# Patient Record
Sex: FEMALE | Race: BLACK OR AFRICAN AMERICAN | URBAN - METROPOLITAN AREA
[De-identification: names, ages, dates, MRNs, and addresses within clinical notes are randomized per-mention and may not be internally consistent; named-entity substitution may affect disease eponyms.]

---

## 2017-01-24 ENCOUNTER — HISTORICAL (OUTPATIENT)
Dept: ADMINISTRATIVE | Facility: HOSPITAL | Age: 77
End: 2017-01-24

## 2017-07-06 ENCOUNTER — HISTORICAL (OUTPATIENT)
Dept: ADMINISTRATIVE | Facility: HOSPITAL | Age: 77
End: 2017-07-06

## 2017-07-06 LAB
ABS NEUT (OLG): 3.92 X10(3)/MCL (ref 2.1–9.2)
ALBUMIN SERPL-MCNC: 3.6 GM/DL (ref 3.4–5)
ALBUMIN/GLOB SERPL: 1 RATIO (ref 1–2)
ALP SERPL-CCNC: 67 UNIT/L (ref 45–117)
ALT SERPL-CCNC: 16 UNIT/L (ref 12–78)
APTT PPP: 30.1 SECOND(S) (ref 23.3–37)
AST SERPL-CCNC: 14 UNIT/L (ref 15–37)
BASOPHILS # BLD AUTO: 0.03 X10(3)/MCL
BASOPHILS NFR BLD AUTO: 0 % (ref 0–1)
BILIRUB SERPL-MCNC: 0.9 MG/DL (ref 0.2–1)
BILIRUBIN DIRECT+TOT PNL SERPL-MCNC: 0.2 MG/DL
BILIRUBIN DIRECT+TOT PNL SERPL-MCNC: 0.7 MG/DL
BUN SERPL-MCNC: 11 MG/DL (ref 7–18)
CALCIUM SERPL-MCNC: 9.2 MG/DL (ref 8.5–10.1)
CHLORIDE SERPL-SCNC: 105 MMOL/L (ref 98–107)
CK MB SERPL-MCNC: <1 NG/ML (ref 1–3.6)
CK SERPL-CCNC: 177 UNIT/L (ref 26–192)
CO2 SERPL-SCNC: 27 MMOL/L (ref 21–32)
CREAT SERPL-MCNC: 1.1 MG/DL (ref 0.6–1.3)
EOSINOPHIL # BLD AUTO: 0.16 10*3/UL
EOSINOPHIL NFR BLD AUTO: 3 % (ref 0–5)
ERYTHROCYTE [DISTWIDTH] IN BLOOD BY AUTOMATED COUNT: 12.8 % (ref 11.5–14.5)
GLOBULIN SER-MCNC: 4.3 GM/ML (ref 2.3–3.5)
GLUCOSE SERPL-MCNC: 94 MG/DL (ref 74–106)
HCT VFR BLD AUTO: 39.9 % (ref 35–46)
HGB BLD-MCNC: 13.8 GM/DL (ref 12–16)
IMM GRANULOCYTES # BLD AUTO: 0.03 10*3/UL
IMM GRANULOCYTES NFR BLD AUTO: 0 %
INR PPP: 0.96 (ref 0.9–1.2)
LYMPHOCYTES # BLD AUTO: 1.68 X10(3)/MCL
LYMPHOCYTES NFR BLD AUTO: 27 % (ref 15–40)
MCH RBC QN AUTO: 30.9 PG (ref 26–34)
MCHC RBC AUTO-ENTMCNC: 34.6 GM/DL (ref 31–37)
MCV RBC AUTO: 89.3 FL (ref 80–100)
MONOCYTES # BLD AUTO: 0.41 X10(3)/MCL
MONOCYTES NFR BLD AUTO: 7 % (ref 4–12)
NEUTROPHILS # BLD AUTO: 3.92 X10(3)/MCL
NEUTROPHILS NFR BLD AUTO: 63 X10(3)/MCL
PLATELET # BLD AUTO: 183 X10(3)/MCL (ref 130–400)
PMV BLD AUTO: 11.2 FL (ref 7.4–10.4)
POC TROPONIN: 0.01 NG/ML (ref 0–0.08)
POTASSIUM SERPL-SCNC: 3.4 MMOL/L (ref 3.5–5.1)
PROT SERPL-MCNC: 7.9 GM/DL (ref 6.4–8.2)
PROTHROMBIN TIME: 12.6 SECOND(S) (ref 11.9–14.4)
RBC # BLD AUTO: 4.47 X10(6)/MCL (ref 4–5.2)
SODIUM SERPL-SCNC: 143 MMOL/L (ref 136–145)
WBC # SPEC AUTO: 6.2 X10(3)/MCL (ref 4.5–11)

## 2017-11-30 ENCOUNTER — HISTORICAL (OUTPATIENT)
Dept: ADMINISTRATIVE | Facility: HOSPITAL | Age: 77
End: 2017-11-30

## 2017-12-11 ENCOUNTER — HISTORICAL (OUTPATIENT)
Dept: ADMINISTRATIVE | Facility: HOSPITAL | Age: 77
End: 2017-12-11

## 2017-12-11 LAB
ABS NEUT (OLG): 3.8 X10(3)/MCL (ref 2.1–9.2)
ALBUMIN SERPL-MCNC: 3.7 GM/DL (ref 3.4–5)
ALBUMIN/GLOB SERPL: 1 RATIO (ref 1–2)
ALP SERPL-CCNC: 79 UNIT/L (ref 45–117)
ALT SERPL-CCNC: 17 UNIT/L (ref 12–78)
AST SERPL-CCNC: 19 UNIT/L (ref 15–37)
BASOPHILS # BLD AUTO: 0.03 X10(3)/MCL
BASOPHILS NFR BLD AUTO: 0 % (ref 0–1)
BILIRUB SERPL-MCNC: 1.3 MG/DL (ref 0.2–1)
BILIRUBIN DIRECT+TOT PNL SERPL-MCNC: 0.3 MG/DL
BILIRUBIN DIRECT+TOT PNL SERPL-MCNC: 1 MG/DL
BUN SERPL-MCNC: 8 MG/DL (ref 7–18)
CALCIUM SERPL-MCNC: 9.2 MG/DL (ref 8.5–10.1)
CHLORIDE SERPL-SCNC: 103 MMOL/L (ref 98–107)
CHOLEST SERPL-MCNC: 217 MG/DL
CHOLEST/HDLC SERPL: 2.2 {RATIO} (ref 0–4.4)
CO2 SERPL-SCNC: 30 MMOL/L (ref 21–32)
CREAT SERPL-MCNC: 1 MG/DL (ref 0.6–1.3)
EOSINOPHIL # BLD AUTO: 0.18 X10(3)/MCL
EOSINOPHIL NFR BLD AUTO: 3 % (ref 0–5)
ERYTHROCYTE [DISTWIDTH] IN BLOOD BY AUTOMATED COUNT: 12.9 % (ref 11.5–14.5)
GLOBULIN SER-MCNC: 4.5 GM/ML (ref 2.3–3.5)
GLUCOSE SERPL-MCNC: 90 MG/DL (ref 74–106)
HCT VFR BLD AUTO: 42.1 % (ref 35–46)
HDLC SERPL-MCNC: 99 MG/DL
HGB BLD-MCNC: 14.7 GM/DL (ref 12–16)
IMM GRANULOCYTES # BLD AUTO: 0.01 10*3/UL
IMM GRANULOCYTES NFR BLD AUTO: 0 %
LDLC SERPL CALC-MCNC: 103 MG/DL (ref 0–130)
LYMPHOCYTES # BLD AUTO: 1.69 X10(3)/MCL
LYMPHOCYTES NFR BLD AUTO: 28 % (ref 15–40)
MCH RBC QN AUTO: 31.5 PG (ref 26–34)
MCHC RBC AUTO-ENTMCNC: 34.9 GM/DL (ref 31–37)
MCV RBC AUTO: 90.3 FL (ref 80–100)
MONOCYTES # BLD AUTO: 0.34 X10(3)/MCL
MONOCYTES NFR BLD AUTO: 6 % (ref 4–12)
NEUTROPHILS # BLD AUTO: 3.8 X10(3)/MCL
NEUTROPHILS NFR BLD AUTO: 63 X10(3)/MCL
PLATELET # BLD AUTO: 217 X10(3)/MCL (ref 130–400)
PMV BLD AUTO: 10.7 FL (ref 7.4–10.4)
POTASSIUM SERPL-SCNC: 3.6 MMOL/L (ref 3.5–5.1)
PROT SERPL-MCNC: 8.2 GM/DL (ref 6.4–8.2)
RBC # BLD AUTO: 4.66 X10(6)/MCL (ref 4–5.2)
SODIUM SERPL-SCNC: 139 MMOL/L (ref 136–145)
T4 FREE SERPL-MCNC: 1.08 NG/DL (ref 0.76–1.46)
TRIGL SERPL-MCNC: 73 MG/DL
TSH SERPL-ACNC: 1.57 MIU/L (ref 0.36–3.74)
VLDLC SERPL CALC-MCNC: 15 MG/DL
WBC # SPEC AUTO: 6 X10(3)/MCL (ref 4.5–11)

## 2018-01-08 ENCOUNTER — HISTORICAL (OUTPATIENT)
Dept: ADMINISTRATIVE | Facility: HOSPITAL | Age: 78
End: 2018-01-08

## 2018-05-04 ENCOUNTER — HISTORICAL (OUTPATIENT)
Dept: ADMINISTRATIVE | Facility: HOSPITAL | Age: 78
End: 2018-05-04

## 2018-05-04 LAB
ABS NEUT (OLG): 3.74 X10(3)/MCL (ref 2.1–9.2)
ALBUMIN SERPL-MCNC: 3.9 GM/DL (ref 3.4–5)
ALBUMIN/GLOB SERPL: 1 RATIO (ref 1–2)
ALP SERPL-CCNC: 82 UNIT/L (ref 45–117)
ALT SERPL-CCNC: 15 UNIT/L (ref 12–78)
AST SERPL-CCNC: 16 UNIT/L (ref 15–37)
BASOPHILS # BLD AUTO: 0.04 X10(3)/MCL
BASOPHILS NFR BLD AUTO: 1 %
BILIRUB SERPL-MCNC: 1.1 MG/DL (ref 0.2–1)
BILIRUBIN DIRECT+TOT PNL SERPL-MCNC: 0.3 MG/DL
BILIRUBIN DIRECT+TOT PNL SERPL-MCNC: 0.8 MG/DL
BUN SERPL-MCNC: 10 MG/DL (ref 7–18)
CALCIUM SERPL-MCNC: 9.1 MG/DL (ref 8.5–10.1)
CHLORIDE SERPL-SCNC: 104 MMOL/L (ref 98–107)
CK MB SERPL-MCNC: <1 NG/ML (ref 1–3.6)
CK SERPL-CCNC: 114 UNIT/L (ref 26–192)
CO2 SERPL-SCNC: 31 MMOL/L (ref 21–32)
CREAT SERPL-MCNC: 1.3 MG/DL (ref 0.6–1.3)
EOSINOPHIL # BLD AUTO: 0.23 X10(3)/MCL
EOSINOPHIL NFR BLD AUTO: 4 %
ERYTHROCYTE [DISTWIDTH] IN BLOOD BY AUTOMATED COUNT: 12.9 % (ref 11.5–14.5)
GLOBULIN SER-MCNC: 4.4 GM/ML (ref 2.3–3.5)
GLUCOSE SERPL-MCNC: 71 MG/DL (ref 74–106)
HCT VFR BLD AUTO: 41.5 % (ref 35–46)
HGB BLD-MCNC: 14.2 GM/DL (ref 12–16)
IMM GRANULOCYTES # BLD AUTO: 0.01 10*3/UL
IMM GRANULOCYTES NFR BLD AUTO: 0 %
LYMPHOCYTES # BLD AUTO: 1.65 X10(3)/MCL
LYMPHOCYTES NFR BLD AUTO: 27 % (ref 13–40)
MAGNESIUM SERPL-MCNC: 2.2 MG/DL (ref 1.8–2.4)
MCH RBC QN AUTO: 30.9 PG (ref 26–34)
MCHC RBC AUTO-ENTMCNC: 34.2 GM/DL (ref 31–37)
MCV RBC AUTO: 90.2 FL (ref 80–100)
MONOCYTES # BLD AUTO: 0.41 X10(3)/MCL
MONOCYTES NFR BLD AUTO: 7 % (ref 4–12)
NEUTROPHILS # BLD AUTO: 3.74 X10(3)/MCL
NEUTROPHILS NFR BLD AUTO: 62 X10(3)/MCL
PLATELET # BLD AUTO: 203 X10(3)/MCL (ref 130–400)
PMV BLD AUTO: 10.6 FL (ref 7.4–10.4)
POC TROPONIN: 0 NG/ML (ref 0–0.08)
POTASSIUM SERPL-SCNC: 3 MMOL/L (ref 3.5–5.1)
PROT SERPL-MCNC: 8.3 GM/DL (ref 6.4–8.2)
RBC # BLD AUTO: 4.6 X10(6)/MCL (ref 4–5.2)
SODIUM SERPL-SCNC: 139 MMOL/L (ref 136–145)
WBC # SPEC AUTO: 6.1 X10(3)/MCL (ref 4.5–11)

## 2018-06-14 ENCOUNTER — HISTORICAL (OUTPATIENT)
Dept: ADMINISTRATIVE | Facility: HOSPITAL | Age: 78
End: 2018-06-14

## 2018-06-14 LAB — POC TROPONIN: 0.01 NG/ML (ref 0–0.08)

## 2018-10-11 ENCOUNTER — HISTORICAL (OUTPATIENT)
Dept: ADMINISTRATIVE | Facility: HOSPITAL | Age: 78
End: 2018-10-11

## 2018-10-11 LAB
ABS NEUT (OLG): 3.87 X10(3)/MCL (ref 2.1–9.2)
ALBUMIN SERPL-MCNC: 3.7 GM/DL (ref 3.4–5)
ALBUMIN/GLOB SERPL: 1 RATIO (ref 1–2)
ALP SERPL-CCNC: 68 UNIT/L (ref 45–117)
ALT SERPL-CCNC: 14 UNIT/L (ref 12–78)
AST SERPL-CCNC: 16 UNIT/L (ref 15–37)
BASOPHILS # BLD AUTO: 0.03 X10(3)/MCL
BASOPHILS NFR BLD AUTO: 0 %
BILIRUB SERPL-MCNC: 1.2 MG/DL (ref 0.2–1)
BILIRUBIN DIRECT+TOT PNL SERPL-MCNC: 0.3 MG/DL
BILIRUBIN DIRECT+TOT PNL SERPL-MCNC: 0.9 MG/DL
BUN SERPL-MCNC: 12 MG/DL (ref 7–18)
CALCIUM SERPL-MCNC: 9 MG/DL (ref 8.5–10.1)
CHLORIDE SERPL-SCNC: 104 MMOL/L (ref 98–107)
CHOLEST SERPL-MCNC: 180 MG/DL
CHOLEST/HDLC SERPL: 2.2 {RATIO} (ref 0–4.4)
CO2 SERPL-SCNC: 31 MMOL/L (ref 21–32)
CREAT SERPL-MCNC: 1 MG/DL (ref 0.6–1.3)
EOSINOPHIL # BLD AUTO: 0.19 10*3/UL
EOSINOPHIL NFR BLD AUTO: 3 %
ERYTHROCYTE [DISTWIDTH] IN BLOOD BY AUTOMATED COUNT: 12.8 % (ref 11.5–14.5)
GLOBULIN SER-MCNC: 4.4 GM/ML (ref 2.3–3.5)
GLUCOSE SERPL-MCNC: 85 MG/DL (ref 74–106)
HCT VFR BLD AUTO: 39.3 % (ref 35–46)
HDLC SERPL-MCNC: 81 MG/DL
HGB BLD-MCNC: 13.4 GM/DL (ref 12–16)
IMM GRANULOCYTES # BLD AUTO: 0.01 10*3/UL
IMM GRANULOCYTES NFR BLD AUTO: 0 %
LDLC SERPL CALC-MCNC: 87 MG/DL (ref 0–130)
LYMPHOCYTES # BLD AUTO: 1.84 X10(3)/MCL
LYMPHOCYTES NFR BLD AUTO: 29 % (ref 13–40)
MCH RBC QN AUTO: 31.2 PG (ref 26–34)
MCHC RBC AUTO-ENTMCNC: 34.1 GM/DL (ref 31–37)
MCV RBC AUTO: 91.6 FL (ref 80–100)
MONOCYTES # BLD AUTO: 0.37 X10(3)/MCL
MONOCYTES NFR BLD AUTO: 6 % (ref 4–12)
NEUTROPHILS # BLD AUTO: 3.87 X10(3)/MCL
NEUTROPHILS NFR BLD AUTO: 61 X10(3)/MCL
PLATELET # BLD AUTO: 214 X10(3)/MCL (ref 130–400)
PMV BLD AUTO: 10.8 FL (ref 7.4–10.4)
POTASSIUM SERPL-SCNC: 3.6 MMOL/L (ref 3.5–5.1)
PROT SERPL-MCNC: 8.1 GM/DL (ref 6.4–8.2)
RBC # BLD AUTO: 4.29 X10(6)/MCL (ref 4–5.2)
SODIUM SERPL-SCNC: 140 MMOL/L (ref 136–145)
TRIGL SERPL-MCNC: 61 MG/DL
TSH SERPL-ACNC: 0.95 MIU/L (ref 0.36–3.74)
VLDLC SERPL CALC-MCNC: 12 MG/DL
WBC # SPEC AUTO: 6.3 X10(3)/MCL (ref 4.5–11)

## 2018-10-23 ENCOUNTER — HISTORICAL (OUTPATIENT)
Dept: ADMINISTRATIVE | Facility: HOSPITAL | Age: 78
End: 2018-10-23

## 2018-10-23 LAB
APTT PPP: 34.6 SECOND(S) (ref 23.3–37)
INR PPP: 1.02 (ref 0.9–1.2)
PROTHROMBIN TIME: 12.7 SECOND(S) (ref 11.9–14.4)

## 2018-12-05 ENCOUNTER — HISTORICAL (OUTPATIENT)
Dept: ADMINISTRATIVE | Facility: HOSPITAL | Age: 78
End: 2018-12-05

## 2018-12-13 ENCOUNTER — HISTORICAL (OUTPATIENT)
Dept: ADMINISTRATIVE | Facility: HOSPITAL | Age: 78
End: 2018-12-13

## 2018-12-13 LAB
ALBUMIN SERPL-MCNC: 4.1 GM/DL (ref 3.4–5)
ALBUMIN/GLOB SERPL: 1 RATIO (ref 1–2)
ALP SERPL-CCNC: 91 UNIT/L (ref 45–117)
ALT SERPL-CCNC: 18 UNIT/L (ref 12–78)
AST SERPL-CCNC: 15 UNIT/L (ref 15–37)
BILIRUB SERPL-MCNC: 0.8 MG/DL (ref 0.2–1)
BILIRUBIN DIRECT+TOT PNL SERPL-MCNC: 0.2 MG/DL
BILIRUBIN DIRECT+TOT PNL SERPL-MCNC: 0.6 MG/DL
BUN SERPL-MCNC: 17 MG/DL (ref 7–18)
CALCIUM SERPL-MCNC: 9.6 MG/DL (ref 8.5–10.1)
CHLORIDE SERPL-SCNC: 101 MMOL/L (ref 98–107)
CHOLEST SERPL-MCNC: 239 MG/DL
CHOLEST/HDLC SERPL: 2.4 {RATIO} (ref 0–4.4)
CO2 SERPL-SCNC: 31 MMOL/L (ref 21–32)
CREAT SERPL-MCNC: 1.1 MG/DL (ref 0.6–1.3)
GLOBULIN SER-MCNC: 5 GM/ML (ref 2.3–3.5)
GLUCOSE SERPL-MCNC: 92 MG/DL (ref 74–106)
HDLC SERPL-MCNC: 98 MG/DL
LDLC SERPL CALC-MCNC: 127 MG/DL (ref 0–130)
POTASSIUM SERPL-SCNC: 4 MMOL/L (ref 3.5–5.1)
PROT SERPL-MCNC: 9.1 GM/DL (ref 6.4–8.2)
SODIUM SERPL-SCNC: 136 MMOL/L (ref 136–145)
TRIGL SERPL-MCNC: 72 MG/DL
VLDLC SERPL CALC-MCNC: 14 MG/DL

## 2019-03-11 ENCOUNTER — HISTORICAL (OUTPATIENT)
Dept: ADMINISTRATIVE | Facility: HOSPITAL | Age: 79
End: 2019-03-11

## 2019-03-11 LAB
ABS NEUT (OLG): 3.03 X10(3)/MCL (ref 2.1–9.2)
BASOPHILS # BLD AUTO: 0.04 X10(3)/MCL
BASOPHILS NFR BLD AUTO: 1 %
BUN SERPL-MCNC: 13 MG/DL (ref 7–18)
CALCIUM SERPL-MCNC: 9.1 MG/DL (ref 8.5–10.1)
CHLORIDE SERPL-SCNC: 105 MMOL/L (ref 98–107)
CO2 SERPL-SCNC: 33 MMOL/L (ref 21–32)
CREAT SERPL-MCNC: 1.2 MG/DL (ref 0.6–1.3)
CREAT/UREA NIT SERPL: 11
EOSINOPHIL # BLD AUTO: 0.19 X10(3)/MCL
EOSINOPHIL NFR BLD AUTO: 4 %
ERYTHROCYTE [DISTWIDTH] IN BLOOD BY AUTOMATED COUNT: 12.7 % (ref 11.5–14.5)
GLUCOSE SERPL-MCNC: 101 MG/DL (ref 74–106)
HCT VFR BLD AUTO: 39.4 % (ref 35–46)
HGB BLD-MCNC: 13.3 GM/DL (ref 12–16)
IMM GRANULOCYTES # BLD AUTO: 0.01 10*3/UL
IMM GRANULOCYTES NFR BLD AUTO: 0 %
LYMPHOCYTES # BLD AUTO: 1.38 X10(3)/MCL
LYMPHOCYTES NFR BLD AUTO: 27 % (ref 13–40)
MCH RBC QN AUTO: 31.1 PG (ref 26–34)
MCHC RBC AUTO-ENTMCNC: 33.8 GM/DL (ref 31–37)
MCV RBC AUTO: 92.1 FL (ref 80–100)
MONOCYTES # BLD AUTO: 0.42 X10(3)/MCL
MONOCYTES NFR BLD AUTO: 8 % (ref 4–12)
NEUTROPHILS # BLD AUTO: 3.03 X10(3)/MCL
NEUTROPHILS NFR BLD AUTO: 60 X10(3)/MCL
PLATELET # BLD AUTO: 204 X10(3)/MCL (ref 130–400)
PMV BLD AUTO: 10.3 FL (ref 7.4–10.4)
POTASSIUM SERPL-SCNC: 3.9 MMOL/L (ref 3.5–5.1)
RBC # BLD AUTO: 4.28 X10(6)/MCL (ref 4–5.2)
SODIUM SERPL-SCNC: 139 MMOL/L (ref 136–145)
TROPONIN I SERPL-MCNC: <0.015 NG/ML (ref 0–0.05)
WBC # SPEC AUTO: 5.1 X10(3)/MCL (ref 4.5–11)

## 2019-06-18 ENCOUNTER — HISTORICAL (OUTPATIENT)
Dept: ADMINISTRATIVE | Facility: HOSPITAL | Age: 79
End: 2019-06-18

## 2019-06-18 LAB
ALBUMIN SERPL-MCNC: 3.4 GM/DL (ref 3.4–5)
ALBUMIN/GLOB SERPL: 0.9 RATIO (ref 1.1–2)
ALP SERPL-CCNC: 73 UNIT/L (ref 45–117)
ALT SERPL-CCNC: 14 UNIT/L (ref 12–78)
AST SERPL-CCNC: 13 UNIT/L (ref 15–37)
BILIRUB SERPL-MCNC: 0.8 MG/DL (ref 0.2–1)
BILIRUBIN DIRECT+TOT PNL SERPL-MCNC: 0.2 MG/DL
BILIRUBIN DIRECT+TOT PNL SERPL-MCNC: 0.6 MG/DL
BUN SERPL-MCNC: 10 MG/DL (ref 7–18)
CALCIUM SERPL-MCNC: 9.3 MG/DL (ref 8.5–10.1)
CHLORIDE SERPL-SCNC: 106 MMOL/L (ref 98–107)
CHOLEST SERPL-MCNC: 158 MG/DL
CHOLEST/HDLC SERPL: 2.1 {RATIO} (ref 0–4.4)
CO2 SERPL-SCNC: 31 MMOL/L (ref 21–32)
CREAT SERPL-MCNC: 1 MG/DL (ref 0.6–1.3)
GLOBULIN SER-MCNC: 3.9 GM/ML (ref 2.3–3.5)
GLUCOSE SERPL-MCNC: 90 MG/DL (ref 74–106)
HDLC SERPL-MCNC: 74 MG/DL
LDLC SERPL CALC-MCNC: 73 MG/DL (ref 0–130)
POTASSIUM SERPL-SCNC: 3.8 MMOL/L (ref 3.5–5.1)
PROT SERPL-MCNC: 7.3 GM/DL (ref 6.4–8.2)
SODIUM SERPL-SCNC: 142 MMOL/L (ref 136–145)
TRIGL SERPL-MCNC: 56 MG/DL
VLDLC SERPL CALC-MCNC: 11 MG/DL

## 2019-08-12 ENCOUNTER — HISTORICAL (OUTPATIENT)
Dept: ADMINISTRATIVE | Facility: HOSPITAL | Age: 79
End: 2019-08-12

## 2019-08-12 LAB
ABS NEUT (OLG): 4.8 X10(3)/MCL (ref 2.1–9.2)
ALBUMIN SERPL-MCNC: 3.6 GM/DL (ref 3.4–5)
ALBUMIN/GLOB SERPL: 0.8 RATIO (ref 1.1–2)
ALP SERPL-CCNC: 74 UNIT/L (ref 45–117)
ALT SERPL-CCNC: 17 UNIT/L (ref 12–78)
AMYLASE SERPL-CCNC: 115 UNIT/L (ref 25–115)
APPEARANCE, UA: CLEAR
AST SERPL-CCNC: 24 UNIT/L (ref 15–37)
BACTERIA #/AREA URNS AUTO: ABNORMAL /[HPF]
BASOPHILS # BLD AUTO: 0.01 X10(3)/MCL
BASOPHILS NFR BLD AUTO: 0 %
BILIRUB SERPL-MCNC: 2.8 MG/DL (ref 0.2–1)
BILIRUB UR QL STRIP: NEGATIVE
BILIRUBIN DIRECT+TOT PNL SERPL-MCNC: 0.4 MG/DL
BILIRUBIN DIRECT+TOT PNL SERPL-MCNC: 2.4 MG/DL
BUN SERPL-MCNC: 13 MG/DL (ref 7–18)
CALCIUM SERPL-MCNC: 9.4 MG/DL (ref 8.5–10.1)
CHLORIDE SERPL-SCNC: 107 MMOL/L (ref 98–107)
CO2 SERPL-SCNC: 29 MMOL/L (ref 21–32)
COLOR UR: YELLOW
CREAT SERPL-MCNC: 1.1 MG/DL (ref 0.6–1.3)
EOSINOPHIL # BLD AUTO: 0.15 X10(3)/MCL
EOSINOPHIL NFR BLD AUTO: 2 %
ERYTHROCYTE [DISTWIDTH] IN BLOOD BY AUTOMATED COUNT: 13.2 % (ref 11.5–14.5)
GLOBULIN SER-MCNC: 4.4 GM/ML (ref 2.3–3.5)
GLUCOSE (UA): NORMAL
GLUCOSE SERPL-MCNC: 88 MG/DL (ref 74–106)
HCT VFR BLD AUTO: 40.2 % (ref 35–46)
HGB BLD-MCNC: 13.5 GM/DL (ref 12–16)
HGB UR QL STRIP: 0.06 MG/DL
HYALINE CASTS #/AREA URNS LPF: ABNORMAL /[LPF]
IMM GRANULOCYTES # BLD AUTO: 0.02 10*3/UL
IMM GRANULOCYTES NFR BLD AUTO: 0 %
KETONES UR QL STRIP: NEGATIVE
LACTATE SERPL-SCNC: 1.4 MMOL/L (ref 0.4–2)
LEUKOCYTE ESTERASE UR QL STRIP: 250 LEU/UL
LIPASE SERPL-CCNC: 180 UNIT/L (ref 73–393)
LYMPHOCYTES # BLD AUTO: 1.35 X10(3)/MCL
LYMPHOCYTES NFR BLD AUTO: 20 % (ref 13–40)
MCH RBC QN AUTO: 31.5 PG (ref 26–34)
MCHC RBC AUTO-ENTMCNC: 33.6 GM/DL (ref 31–37)
MCV RBC AUTO: 93.7 FL (ref 80–100)
MONOCYTES # BLD AUTO: 0.5 X10(3)/MCL
MONOCYTES NFR BLD AUTO: 7 % (ref 0–24)
MUCOUS THREADS URNS QL MICRO: ABNORMAL
NEUTROPHILS # BLD AUTO: 4.8 X10(3)/MCL
NEUTROPHILS NFR BLD AUTO: 70 X10(3)/MCL
NITRITE UR QL STRIP: NEGATIVE
PH UR STRIP: 6 [PH] (ref 4.5–8)
PLATELET # BLD AUTO: 193 X10(3)/MCL (ref 130–400)
PMV BLD AUTO: 10.2 FL (ref 7.4–10.4)
POTASSIUM SERPL-SCNC: 3.2 MMOL/L (ref 3.5–5.1)
PROT SERPL-MCNC: 8 GM/DL (ref 6.4–8.2)
PROT UR QL STRIP: 20 MG/DL
RBC # BLD AUTO: 4.29 X10(6)/MCL (ref 4–5.2)
RBC #/AREA URNS AUTO: ABNORMAL /[HPF]
SODIUM SERPL-SCNC: 142 MMOL/L (ref 136–145)
SP GR UR STRIP: >1.05 (ref 1–1.03)
SQUAMOUS #/AREA URNS LPF: >100 /[LPF]
TROPONIN I SERPL-MCNC: <0.015 NG/ML (ref 0–0.05)
UROBILINOGEN UR STRIP-ACNC: NORMAL
WBC # SPEC AUTO: 6.8 X10(3)/MCL (ref 4.5–11)
WBC #/AREA URNS AUTO: ABNORMAL /HPF

## 2019-08-14 ENCOUNTER — HISTORICAL (OUTPATIENT)
Dept: ADMINISTRATIVE | Facility: HOSPITAL | Age: 79
End: 2019-08-14

## 2019-08-14 LAB
ALBUMIN SERPL-MCNC: 3.7 GM/DL (ref 3.4–5)
ALBUMIN/GLOB SERPL: 0.9 RATIO (ref 1.1–2)
ALP SERPL-CCNC: 71 UNIT/L (ref 45–117)
ALT SERPL-CCNC: 21 UNIT/L (ref 12–78)
AST SERPL-CCNC: 25 UNIT/L (ref 15–37)
BILIRUB SERPL-MCNC: 1.7 MG/DL (ref 0.2–1)
BILIRUBIN DIRECT+TOT PNL SERPL-MCNC: 0.4 MG/DL
BILIRUBIN DIRECT+TOT PNL SERPL-MCNC: 1.3 MG/DL
BUN SERPL-MCNC: 10 MG/DL (ref 7–18)
CALCIUM SERPL-MCNC: 9.2 MG/DL (ref 8.5–10.1)
CHLORIDE SERPL-SCNC: 109 MMOL/L (ref 98–107)
CO2 SERPL-SCNC: 29 MMOL/L (ref 21–32)
CREAT SERPL-MCNC: 1.2 MG/DL (ref 0.6–1.3)
GLOBULIN SER-MCNC: 4 GM/ML (ref 2.3–3.5)
GLUCOSE SERPL-MCNC: 100 MG/DL (ref 74–106)
POTASSIUM SERPL-SCNC: 3.2 MMOL/L (ref 3.5–5.1)
PROT SERPL-MCNC: 7.7 GM/DL (ref 6.4–8.2)
SODIUM SERPL-SCNC: 145 MMOL/L (ref 136–145)

## 2019-10-22 ENCOUNTER — HISTORICAL (OUTPATIENT)
Dept: ADMINISTRATIVE | Facility: HOSPITAL | Age: 79
End: 2019-10-22

## 2019-10-22 LAB
ABS NEUT (OLG): 5.98 X10(3)/MCL (ref 2.1–9.2)
ALBUMIN SERPL-MCNC: 3.7 GM/DL (ref 3.4–5)
ALBUMIN/GLOB SERPL: 0.8 RATIO (ref 1.1–2)
ALP SERPL-CCNC: 76 UNIT/L (ref 45–117)
ALT SERPL-CCNC: 13 UNIT/L (ref 12–78)
AST SERPL-CCNC: 13 UNIT/L (ref 15–37)
BASOPHILS # BLD AUTO: 0 X10(3)/MCL (ref 0–0.2)
BASOPHILS NFR BLD AUTO: 0 %
BILIRUB SERPL-MCNC: 1.7 MG/DL (ref 0.2–1)
BILIRUBIN DIRECT+TOT PNL SERPL-MCNC: 0.4 MG/DL (ref 0–0.2)
BILIRUBIN DIRECT+TOT PNL SERPL-MCNC: 1.3 MG/DL
BUN SERPL-MCNC: 9 MG/DL (ref 7–18)
CALCIUM SERPL-MCNC: 8.9 MG/DL (ref 8.5–10.1)
CHLORIDE SERPL-SCNC: 104 MMOL/L (ref 98–107)
CHOLEST SERPL-MCNC: 142 MG/DL
CHOLEST/HDLC SERPL: 1.8 {RATIO} (ref 0–4.4)
CO2 SERPL-SCNC: 30 MMOL/L (ref 21–32)
CREAT SERPL-MCNC: 1 MG/DL (ref 0.6–1.3)
EOSINOPHIL # BLD AUTO: 0.3 X10(3)/MCL (ref 0–0.9)
EOSINOPHIL NFR BLD AUTO: 3 %
ERYTHROCYTE [DISTWIDTH] IN BLOOD BY AUTOMATED COUNT: 13.8 % (ref 11.5–14.5)
GLOBULIN SER-MCNC: 4.4 GM/ML (ref 2.3–3.5)
GLUCOSE SERPL-MCNC: 85 MG/DL (ref 74–106)
HCT VFR BLD AUTO: 42 % (ref 35–46)
HDLC SERPL-MCNC: 79 MG/DL (ref 40–59)
HGB BLD-MCNC: 13.8 GM/DL (ref 12–16)
IMM GRANULOCYTES # BLD AUTO: 0.04 10*3/UL
IMM GRANULOCYTES NFR BLD AUTO: 0 %
LDLC SERPL CALC-MCNC: 49 MG/DL
LYMPHOCYTES # BLD AUTO: 1.8 X10(3)/MCL (ref 0.6–4.6)
LYMPHOCYTES NFR BLD AUTO: 21 %
MCH RBC QN AUTO: 31.8 PG (ref 26–34)
MCHC RBC AUTO-ENTMCNC: 32.9 GM/DL (ref 31–37)
MCV RBC AUTO: 96.8 FL (ref 80–100)
MONOCYTES # BLD AUTO: 0.4 X10(3)/MCL (ref 0.1–1.3)
MONOCYTES NFR BLD AUTO: 5 %
NEUTROPHILS # BLD AUTO: 5.98 X10(3)/MCL (ref 2.1–9.2)
NEUTROPHILS NFR BLD AUTO: 70 %
PLATELET # BLD AUTO: 198 X10(3)/MCL (ref 130–400)
PMV BLD AUTO: 10.6 FL (ref 7.4–10.4)
POTASSIUM SERPL-SCNC: 4 MMOL/L (ref 3.5–5.1)
PROT SERPL-MCNC: 8.1 GM/DL (ref 6.4–8.2)
RBC # BLD AUTO: 4.34 X10(6)/MCL (ref 4–5.2)
SODIUM SERPL-SCNC: 140 MMOL/L (ref 136–145)
T3FREE SERPL-MCNC: 2.82 PG/ML (ref 2.18–3.98)
T4 FREE SERPL-MCNC: 1.11 NG/DL (ref 0.76–1.46)
TRIGL SERPL-MCNC: 69 MG/DL
TSH SERPL-ACNC: 1.35 MIU/L (ref 0.36–3.74)
VLDLC SERPL CALC-MCNC: 14 MG/DL
WBC # SPEC AUTO: 8.5 X10(3)/MCL (ref 4.5–11)

## 2019-12-05 ENCOUNTER — HISTORICAL (OUTPATIENT)
Dept: ADMINISTRATIVE | Facility: HOSPITAL | Age: 79
End: 2019-12-05

## 2020-03-05 ENCOUNTER — HISTORICAL (OUTPATIENT)
Dept: ADMINISTRATIVE | Facility: HOSPITAL | Age: 80
End: 2020-03-05

## 2020-03-05 LAB
ALBUMIN SERPL-MCNC: 3.8 GM/DL (ref 3.4–5)
ALBUMIN/GLOB SERPL: 0.9 RATIO (ref 1.1–2)
ALP SERPL-CCNC: 59 UNIT/L (ref 45–117)
ALT SERPL-CCNC: 15 UNIT/L (ref 12–78)
AST SERPL-CCNC: 13 UNIT/L (ref 15–37)
BILIRUB SERPL-MCNC: 1.5 MG/DL (ref 0.2–1)
BILIRUBIN DIRECT+TOT PNL SERPL-MCNC: 0.4 MG/DL (ref 0–0.2)
BILIRUBIN DIRECT+TOT PNL SERPL-MCNC: 1.1 MG/DL
BUN SERPL-MCNC: 9 MG/DL (ref 7–18)
CALCIUM SERPL-MCNC: 9 MG/DL (ref 8.5–10.1)
CHLORIDE SERPL-SCNC: 105 MMOL/L (ref 98–107)
CHOLEST SERPL-MCNC: 133 MG/DL
CHOLEST/HDLC SERPL: 1.6 {RATIO} (ref 0–4.4)
CO2 SERPL-SCNC: 31 MMOL/L (ref 21–32)
CREAT SERPL-MCNC: 1 MG/DL (ref 0.6–1.3)
DEPRECATED CALCIDIOL+CALCIFEROL SERPL-MC: 10.7 NG/ML (ref 30–80)
FOLATE SERPL-MCNC: 4.4 NG/ML (ref 3.1–17.5)
GLOBULIN SER-MCNC: 4.2 GM/ML (ref 2.3–3.5)
GLUCOSE SERPL-MCNC: 90 MG/DL (ref 74–106)
HDLC SERPL-MCNC: 84 MG/DL (ref 40–59)
HIV 1+2 AB+HIV1 P24 AG SERPL QL IA: NONREACTIVE
LDLC SERPL CALC-MCNC: 39 MG/DL
POTASSIUM SERPL-SCNC: 3.4 MMOL/L (ref 3.5–5.1)
PROT SERPL-MCNC: 8 GM/DL (ref 6.4–8.2)
RPR SER QL: NORMAL
SODIUM SERPL-SCNC: 140 MMOL/L (ref 136–145)
T PALLIDUM AB SER QL: REACTIVE
T4 FREE SERPL-MCNC: 1.26 NG/DL (ref 0.76–1.46)
TRIGL SERPL-MCNC: 52 MG/DL
TSH SERPL-ACNC: 0.73 MIU/L (ref 0.36–3.74)
VIT B12 SERPL-MCNC: 661 PG/ML (ref 193–986)
VLDLC SERPL CALC-MCNC: 10 MG/DL

## 2020-06-08 ENCOUNTER — HISTORICAL (OUTPATIENT)
Dept: ADMINISTRATIVE | Facility: HOSPITAL | Age: 80
End: 2020-06-08

## 2020-06-08 LAB
BUN SERPL-MCNC: 8 MG/DL (ref 7–18)
CREAT SERPL-MCNC: 0.9 MG/DL (ref 0.6–1.3)

## 2020-10-15 ENCOUNTER — HISTORICAL (OUTPATIENT)
Dept: ADMINISTRATIVE | Facility: HOSPITAL | Age: 80
End: 2020-10-15

## 2020-12-28 ENCOUNTER — HISTORICAL (OUTPATIENT)
Dept: ADMINISTRATIVE | Facility: HOSPITAL | Age: 80
End: 2020-12-28

## 2020-12-28 LAB
ABS NEUT (OLG): 9.33 X10(3)/MCL (ref 2.1–9.2)
ALBUMIN SERPL-MCNC: 3.4 GM/DL (ref 3.4–4.8)
ALBUMIN/GLOB SERPL: 0.8 RATIO (ref 1.1–2)
ALP SERPL-CCNC: 72 UNIT/L (ref 40–150)
ALT SERPL-CCNC: 16 UNIT/L (ref 0–55)
AST SERPL-CCNC: 26 UNIT/L (ref 5–34)
BASOPHILS # BLD AUTO: 0 X10(3)/MCL (ref 0–0.2)
BASOPHILS NFR BLD AUTO: 0 %
BILIRUB SERPL-MCNC: 2.2 MG/DL
BILIRUBIN DIRECT+TOT PNL SERPL-MCNC: 0.9 MG/DL (ref 0–0.5)
BILIRUBIN DIRECT+TOT PNL SERPL-MCNC: 1.3 MG/DL (ref 0–0.8)
BUN SERPL-MCNC: 7 MG/DL (ref 9.8–20.1)
CALCIUM SERPL-MCNC: 9.7 MG/DL (ref 8.4–10.2)
CHLORIDE SERPL-SCNC: 97 MMOL/L (ref 98–107)
CO2 SERPL-SCNC: 29 MMOL/L (ref 23–31)
CREAT SERPL-MCNC: 0.88 MG/DL (ref 0.55–1.02)
EOSINOPHIL # BLD AUTO: 0.2 X10(3)/MCL (ref 0–0.9)
EOSINOPHIL NFR BLD AUTO: 2 %
ERYTHROCYTE [DISTWIDTH] IN BLOOD BY AUTOMATED COUNT: 12.9 % (ref 11.5–14.5)
GLOBULIN SER-MCNC: 4.5 GM/DL (ref 2.4–3.5)
GLUCOSE SERPL-MCNC: 97 MG/DL (ref 82–115)
HCT VFR BLD AUTO: 37.5 % (ref 35–46)
HGB BLD-MCNC: 12.5 GM/DL (ref 12–16)
IMM GRANULOCYTES # BLD AUTO: 0.04 10*3/UL
IMM GRANULOCYTES NFR BLD AUTO: 0 %
LYMPHOCYTES # BLD AUTO: 1.3 X10(3)/MCL (ref 0.6–4.6)
LYMPHOCYTES NFR BLD AUTO: 11 %
MCH RBC QN AUTO: 31.5 PG (ref 26–34)
MCHC RBC AUTO-ENTMCNC: 33.3 GM/DL (ref 31–37)
MCV RBC AUTO: 94.5 FL (ref 80–100)
MONOCYTES # BLD AUTO: 0.8 X10(3)/MCL (ref 0.1–1.3)
MONOCYTES NFR BLD AUTO: 7 %
NEUTROPHILS # BLD AUTO: 9.33 X10(3)/MCL (ref 2.1–9.2)
NEUTROPHILS NFR BLD AUTO: 80 %
PLATELET # BLD AUTO: 245 X10(3)/MCL (ref 130–400)
PMV BLD AUTO: 10.3 FL (ref 7.4–10.4)
POTASSIUM SERPL-SCNC: 3.9 MMOL/L (ref 3.5–5.1)
PROT SERPL-MCNC: 7.9 GM/DL (ref 5.8–7.6)
RBC # BLD AUTO: 3.97 X10(6)/MCL (ref 4–5.2)
SODIUM SERPL-SCNC: 138 MMOL/L (ref 136–145)
WBC # SPEC AUTO: 11.7 X10(3)/MCL (ref 4.5–11)

## 2021-01-16 ENCOUNTER — HISTORICAL (OUTPATIENT)
Dept: ADMINISTRATIVE | Facility: HOSPITAL | Age: 81
End: 2021-01-16

## 2021-01-16 LAB
APPEARANCE, UA: ABNORMAL
BACTERIA #/AREA URNS AUTO: ABNORMAL /HPF
BILIRUB UR QL STRIP: 4 MG/DL
COLOR UR: ABNORMAL
GLUCOSE (UA): NEGATIVE
HGB UR QL STRIP: 0.2
HYALINE CASTS #/AREA URNS LPF: ABNORMAL /LPF
KETONES UR QL STRIP: 40 MG/DL
LEUKOCYTE ESTERASE UR QL STRIP: NEGATIVE
MUCOUS THREADS URNS QL MICRO: ABNORMAL
NITRITE UR QL STRIP: NEGATIVE
PH UR STRIP: 6 [PH] (ref 4.5–8)
PROT UR QL STRIP: 100 MG/DL
RBC #/AREA URNS AUTO: ABNORMAL /HPF
SP GR UR STRIP: 1.02 (ref 1–1.03)
SQUAMOUS #/AREA URNS LPF: >100 /LPF
UROBILINOGEN UR STRIP-ACNC: 2 MG/DL
WBC #/AREA URNS AUTO: ABNORMAL /HPF

## 2021-01-18 LAB — FINAL CULTURE: NO GROWTH

## 2021-01-21 ENCOUNTER — HISTORICAL (OUTPATIENT)
Dept: ADMINISTRATIVE | Facility: HOSPITAL | Age: 81
End: 2021-01-21

## 2021-01-21 LAB
APPEARANCE, UA: ABNORMAL
BACTERIA #/AREA URNS AUTO: ABNORMAL /HPF
BILIRUB UR QL STRIP: 3 MG/DL
COLOR UR: ABNORMAL
GLUCOSE (UA): NEGATIVE
HGB UR QL STRIP: 0.2
HYALINE CASTS #/AREA URNS LPF: ABNORMAL /LPF
KETONES UR QL STRIP: 20 MG/DL
LEUKOCYTE ESTERASE UR QL STRIP: NEGATIVE
NITRITE UR QL STRIP: NEGATIVE
PH UR STRIP: 6 [PH] (ref 4.5–8)
PROT UR QL STRIP: 70 MG/DL
RBC #/AREA URNS AUTO: ABNORMAL /HPF
SP GR UR STRIP: 1.01 (ref 1–1.03)
SQUAMOUS #/AREA URNS LPF: >100 /LPF
UROBILINOGEN UR STRIP-ACNC: NORMAL
WBC #/AREA URNS AUTO: ABNORMAL /HPF

## 2021-01-27 ENCOUNTER — HISTORICAL (OUTPATIENT)
Dept: ADMINISTRATIVE | Facility: HOSPITAL | Age: 81
End: 2021-01-27

## 2021-01-31 LAB
ABS NEUT (OLG): 7.39 X10(3)/MCL (ref 2.1–9.2)
ALBUMIN SERPL-MCNC: 2 GM/DL (ref 3.4–4.8)
ALBUMIN/GLOB SERPL: 0.4 RATIO (ref 1.1–2)
ALP SERPL-CCNC: 749 UNIT/L (ref 40–150)
ALT SERPL-CCNC: 271 UNIT/L (ref 0–55)
AST SERPL-CCNC: 342 UNIT/L (ref 5–34)
BASOPHILS # BLD AUTO: 0 X10(3)/MCL (ref 0–0.2)
BASOPHILS NFR BLD AUTO: 0 %
BILIRUB SERPL-MCNC: 18.7 MG/DL
BILIRUBIN DIRECT+TOT PNL SERPL-MCNC: 12.6 MG/DL (ref 0–0.5)
BILIRUBIN DIRECT+TOT PNL SERPL-MCNC: 6.1 MG/DL (ref 0–0.8)
BUN SERPL-MCNC: 14 MG/DL (ref 9.8–20.1)
CALCIUM SERPL-MCNC: 8.6 MG/DL (ref 8.4–10.2)
CHLORIDE SERPL-SCNC: 89 MMOL/L (ref 98–107)
CK MB SERPL-MCNC: 4.6 NG/ML
CK SERPL-CCNC: 215 U/L (ref 29–168)
CO2 SERPL-SCNC: 26 MMOL/L (ref 23–31)
CREAT SERPL-MCNC: 0.7 MG/DL (ref 0.55–1.02)
EOSINOPHIL # BLD AUTO: 0 X10(3)/MCL (ref 0–0.9)
EOSINOPHIL NFR BLD AUTO: 0 %
ERYTHROCYTE [DISTWIDTH] IN BLOOD BY AUTOMATED COUNT: 16.4 % (ref 11.5–14.5)
GLOBULIN SER-MCNC: 4.7 GM/DL (ref 2.4–3.5)
GLUCOSE SERPL-MCNC: 65 MG/DL (ref 82–115)
HAV IGM SERPL QL IA: NONREACTIVE
HBV CORE IGM SERPL QL IA: NONREACTIVE
HBV SURFACE AG SERPL QL IA: NONREACTIVE
HCT VFR BLD AUTO: 34.9 % (ref 35–46)
HCV AB SERPL QL IA: NONREACTIVE
HGB BLD-MCNC: 12.6 GM/DL (ref 12–16)
HIV 1+2 AB+HIV1 P24 AG SERPL QL IA: NONREACTIVE
IMM GRANULOCYTES # BLD AUTO: 0.05 10*3/UL
IMM GRANULOCYTES NFR BLD AUTO: 1 %
LYMPHOCYTES # BLD AUTO: 0.7 X10(3)/MCL (ref 0.6–4.6)
LYMPHOCYTES NFR BLD AUTO: 8 %
MAGNESIUM SERPL-MCNC: 2.11 MG/DL (ref 1.6–2.6)
MCH RBC QN AUTO: 30.9 PG (ref 26–34)
MCHC RBC AUTO-ENTMCNC: 36.1 GM/DL (ref 31–37)
MCV RBC AUTO: 85.5 FL (ref 80–100)
MONOCYTES # BLD AUTO: 0.3 X10(3)/MCL (ref 0.1–1.3)
MONOCYTES NFR BLD AUTO: 3 %
NEUTROPHILS # BLD AUTO: 7.39 X10(3)/MCL (ref 2.1–9.2)
NEUTROPHILS NFR BLD AUTO: 88 %
PLATELET # BLD AUTO: 192 X10(3)/MCL (ref 130–400)
PMV BLD AUTO: 11.8 FL (ref 7.4–10.4)
POTASSIUM SERPL-SCNC: 2.6 MMOL/L (ref 3.5–5.1)
PROT SERPL-MCNC: 6.7 GM/DL (ref 5.8–7.6)
RBC # BLD AUTO: 4.08 X10(6)/MCL (ref 4–5.2)
SARS-COV-2 AG RESP QL IA.RAPID: NEGATIVE
SODIUM SERPL-SCNC: 132 MMOL/L (ref 136–145)
TROPONIN I SERPL-MCNC: 0.03 NG/ML (ref 0–0.04)
WBC # SPEC AUTO: 8.4 X10(3)/MCL (ref 4.5–11)

## 2021-02-01 LAB
ABS NEUT (OLG): 7.87 X10(3)/MCL (ref 2.1–9.2)
ALBUMIN SERPL-MCNC: 0.7 GM/DL (ref 3.4–4.8)
ALBUMIN SERPL-MCNC: 1.5 GM/DL (ref 3.4–4.8)
ALBUMIN SERPL-MCNC: 1.5 GM/DL (ref 3.4–4.8)
ALBUMIN/GLOB SERPL: 0.4 RATIO (ref 1.1–2)
ALP SERPL-CCNC: 257 UNIT/L (ref 40–150)
ALP SERPL-CCNC: 554 UNIT/L (ref 40–150)
ALP SERPL-CCNC: 573 UNIT/L (ref 40–150)
ALT SERPL-CCNC: 192 UNIT/L (ref 0–55)
ALT SERPL-CCNC: 192 UNIT/L (ref 0–55)
ALT SERPL-CCNC: 91 UNIT/L (ref 0–55)
AMMONIA PLAS-MSCNC: <13.5 UMOL/L (ref 18–72)
APPEARANCE, UA: ABNORMAL
APTT PPP: 32.6 SECOND(S) (ref 23.3–37)
AST SERPL-CCNC: 105 UNIT/L (ref 5–34)
AST SERPL-CCNC: 204 UNIT/L (ref 5–34)
AST SERPL-CCNC: 224 UNIT/L (ref 5–34)
BACTERIA #/AREA URNS AUTO: ABNORMAL /HPF
BASOPHILS # BLD AUTO: 0 X10(3)/MCL (ref 0–0.2)
BASOPHILS NFR BLD AUTO: 0 %
BILIRUB SERPL-MCNC: 14.2 MG/DL
BILIRUB SERPL-MCNC: 14.9 MG/DL
BILIRUB SERPL-MCNC: 6.8 MG/DL
BILIRUB UR QL STRIP: 6 MG/DL
BILIRUBIN DIRECT+TOT PNL SERPL-MCNC: 1.9 MG/DL (ref 0–0.8)
BILIRUBIN DIRECT+TOT PNL SERPL-MCNC: 10.2 MG/DL (ref 0–0.5)
BILIRUBIN DIRECT+TOT PNL SERPL-MCNC: 4.4 MG/DL (ref 0–0.8)
BILIRUBIN DIRECT+TOT PNL SERPL-MCNC: 4.7 MG/DL (ref 0–0.8)
BILIRUBIN DIRECT+TOT PNL SERPL-MCNC: 4.9 MG/DL (ref 0–0.5)
BILIRUBIN DIRECT+TOT PNL SERPL-MCNC: 9.8 MG/DL (ref 0–0.5)
BUN SERPL-MCNC: 11 MG/DL (ref 9.8–20.1)
BUN SERPL-MCNC: 6 MG/DL (ref 9.8–20.1)
BUN SERPL-MCNC: 8 MG/DL (ref 9.8–20.1)
BUN SERPL-MCNC: 9 MG/DL (ref 9.8–20.1)
CALCIUM SERPL-MCNC: 5 MG/DL (ref 8.4–10.2)
CALCIUM SERPL-MCNC: 6.5 MG/DL (ref 8.4–10.2)
CALCIUM SERPL-MCNC: 7.3 MG/DL (ref 8.4–10.2)
CALCIUM SERPL-MCNC: 7.9 MG/DL (ref 8.4–10.2)
CHLORIDE SERPL-SCNC: 100 MMOL/L (ref 98–107)
CHLORIDE SERPL-SCNC: 103 MMOL/L (ref 98–107)
CHLORIDE SERPL-SCNC: 115 MMOL/L (ref 98–107)
CHLORIDE SERPL-SCNC: 94 MMOL/L (ref 98–107)
CHOLEST SERPL-MCNC: 220 MG/DL
CHOLEST/HDLC SERPL: 37 {RATIO} (ref 0–5)
CO2 SERPL-SCNC: 11 MMOL/L (ref 23–31)
CO2 SERPL-SCNC: 17 MMOL/L (ref 23–31)
CO2 SERPL-SCNC: 25 MMOL/L (ref 23–31)
CO2 SERPL-SCNC: 30 MMOL/L (ref 23–31)
COLOR UR: ABNORMAL
CREAT SERPL-MCNC: 0.25 MG/DL (ref 0.55–1.02)
CREAT SERPL-MCNC: 0.35 MG/DL (ref 0.55–1.02)
CREAT SERPL-MCNC: 0.58 MG/DL (ref 0.55–1.02)
CREAT SERPL-MCNC: 0.61 MG/DL (ref 0.55–1.02)
CREAT/UREA NIT SERPL: 23
DEPRECATED CALCIDIOL+CALCIFEROL SERPL-MC: 3.6 NG/ML (ref 30–80)
EOSINOPHIL # BLD AUTO: 0 X10(3)/MCL (ref 0–0.9)
EOSINOPHIL NFR BLD AUTO: 0 %
ERYTHROCYTE [DISTWIDTH] IN BLOOD BY AUTOMATED COUNT: 16.1 % (ref 11.5–14.5)
EST. AVERAGE GLUCOSE BLD GHB EST-MCNC: 105.4 MG/DL
GLOBULIN SER-MCNC: 1.7 GM/DL (ref 2.4–3.5)
GLOBULIN SER-MCNC: 3.5 GM/DL (ref 2.4–3.5)
GLOBULIN SER-MCNC: 3.7 GM/DL (ref 2.4–3.5)
GLUCOSE (UA): NEGATIVE
GLUCOSE SERPL-MCNC: 181 MG/DL (ref 82–115)
GLUCOSE SERPL-MCNC: 33 MG/DL (ref 82–115)
GLUCOSE SERPL-MCNC: 48 MG/DL (ref 82–115)
GLUCOSE SERPL-MCNC: 70 MG/DL (ref 82–115)
HBA1C MFR BLD: 5.3 %
HCT VFR BLD AUTO: 30.8 % (ref 35–46)
HDLC SERPL-MCNC: 6 MG/DL (ref 35–60)
HGB BLD-MCNC: 11.2 GM/DL (ref 12–16)
HGB UR QL STRIP: 0.03 MG/DL
HYALINE CASTS #/AREA URNS LPF: 0 /LPF
IMM GRANULOCYTES # BLD AUTO: 0.08 10*3/UL
IMM GRANULOCYTES NFR BLD AUTO: 1 %
INR PPP: 1.38 (ref 0.9–1.2)
KETONES UR QL STRIP: 40 MG/DL
LDLC SERPL CALC-MCNC: 194 MG/DL (ref 50–140)
LEUKOCYTE ESTERASE UR QL STRIP: NEGATIVE
LYMPHOCYTES # BLD AUTO: 0.6 X10(3)/MCL (ref 0.6–4.6)
LYMPHOCYTES NFR BLD AUTO: 7 %
MAGNESIUM SERPL-MCNC: 0.88 MG/DL (ref 1.6–2.6)
MAGNESIUM SERPL-MCNC: 2.2 MG/DL (ref 1.6–2.6)
MCH RBC QN AUTO: 30.4 PG (ref 26–34)
MCHC RBC AUTO-ENTMCNC: 36.4 GM/DL (ref 31–37)
MCV RBC AUTO: 83.7 FL (ref 80–100)
MONOCYTES # BLD AUTO: 0.3 X10(3)/MCL (ref 0.1–1.3)
MONOCYTES NFR BLD AUTO: 4 %
NEUTROPHILS # BLD AUTO: 7.87 X10(3)/MCL (ref 2.1–9.2)
NEUTROPHILS NFR BLD AUTO: 88 %
NITRITE UR QL STRIP: NEGATIVE
PH UR STRIP: 6 [PH] (ref 4.5–8)
PHOSPHATE SERPL-MCNC: 1.7 MG/DL (ref 2.3–4.7)
PHOSPHATE SERPL-MCNC: <0.7 MG/DL (ref 2.3–4.7)
PLATELET # BLD AUTO: 161 X10(3)/MCL (ref 130–400)
PMV BLD AUTO: 11.4 FL (ref 7.4–10.4)
POTASSIUM SERPL-SCNC: 2.1 MMOL/L (ref 3.5–5.1)
POTASSIUM SERPL-SCNC: 3.1 MMOL/L (ref 3.5–5.1)
POTASSIUM SERPL-SCNC: 3.8 MMOL/L (ref 3.5–5.1)
POTASSIUM SERPL-SCNC: 3.9 MMOL/L (ref 3.5–5.1)
POTASSIUM SERPL-SCNC: 4 MMOL/L (ref 3.5–5.1)
PROT SERPL-MCNC: 2.4 GM/DL (ref 5.8–7.6)
PROT SERPL-MCNC: 5 GM/DL (ref 5.8–7.6)
PROT SERPL-MCNC: 5.2 GM/DL (ref 5.8–7.6)
PROT UR QL STRIP: 30 MG/DL
PROTHROMBIN TIME: 16.6 SECOND(S) (ref 11.9–14.4)
PTH-INTACT SERPL-MCNC: 110.9 PG/ML (ref 18.4–80.1)
RBC # BLD AUTO: 3.68 X10(6)/MCL (ref 4–5.2)
RBC #/AREA URNS AUTO: ABNORMAL /HPF
RET# (OHS): 0.02 X10(6)/MCL (ref 0.02–0.08)
RETICULOCYTE COUNT AUTOMATED (OLG): 0.6 % (ref 0.5–1.5)
SODIUM SERPL-SCNC: 132 MMOL/L (ref 136–145)
SODIUM SERPL-SCNC: 136 MMOL/L (ref 136–145)
SODIUM SERPL-SCNC: 136 MMOL/L (ref 136–145)
SODIUM SERPL-SCNC: 140 MMOL/L (ref 136–145)
SP GR UR STRIP: 1.02 (ref 1–1.03)
SQUAMOUS #/AREA URNS LPF: ABNORMAL /LPF
T PALLIDUM AB SER QL: NONREACTIVE
TRIGL SERPL-MCNC: 98 MG/DL (ref 37–140)
TSH SERPL-ACNC: 1.41 UIU/ML (ref 0.35–4.94)
UROBILINOGEN UR STRIP-ACNC: NORMAL
VLDLC SERPL CALC-MCNC: 20 MG/DL
WBC # SPEC AUTO: 9 X10(3)/MCL (ref 4.5–11)
WBC #/AREA URNS AUTO: ABNORMAL /HPF

## 2021-02-02 ENCOUNTER — HISTORICAL (OUTPATIENT)
Dept: ADMINISTRATIVE | Facility: HOSPITAL | Age: 81
End: 2021-02-02

## 2021-02-02 LAB
ABS NEUT (OLG): 7.99 X10(3)/MCL (ref 2.1–9.2)
ALBUMIN SERPL-MCNC: 1.6 GM/DL (ref 3.4–4.8)
ALBUMIN SERPL-MCNC: 1.7 GM/DL (ref 3.4–4.8)
ALBUMIN SERPL-MCNC: 1.7 GM/DL (ref 3.4–4.8)
ALBUMIN/GLOB SERPL: 0.3 RATIO (ref 1.1–2)
ALBUMIN/GLOB SERPL: 0.4 RATIO (ref 1.1–2)
ALBUMIN/GLOB SERPL: 0.4 RATIO (ref 1.1–2)
ALP SERPL-CCNC: 600 UNIT/L (ref 40–150)
ALP SERPL-CCNC: 628 UNIT/L (ref 40–150)
ALP SERPL-CCNC: 673 UNIT/L (ref 40–150)
ALT SERPL-CCNC: 207 UNIT/L (ref 0–55)
ALT SERPL-CCNC: 215 UNIT/L (ref 0–55)
ALT SERPL-CCNC: 218 UNIT/L (ref 0–55)
AST SERPL-CCNC: 225 UNIT/L (ref 5–34)
AST SERPL-CCNC: 232 UNIT/L (ref 5–34)
AST SERPL-CCNC: 245 UNIT/L (ref 5–34)
BASOPHILS # BLD AUTO: 0 X10(3)/MCL (ref 0–0.2)
BASOPHILS NFR BLD AUTO: 0 %
BILIRUB SERPL-MCNC: 15.3 MG/DL
BILIRUB SERPL-MCNC: 15.3 MG/DL
BILIRUB SERPL-MCNC: 16.5 MG/DL
BILIRUBIN DIRECT+TOT PNL SERPL-MCNC: 10.3 MG/DL (ref 0–0.5)
BILIRUBIN DIRECT+TOT PNL SERPL-MCNC: 11.3 MG/DL (ref 0–0.5)
BILIRUBIN DIRECT+TOT PNL SERPL-MCNC: 5 MG/DL (ref 0–0.8)
BILIRUBIN DIRECT+TOT PNL SERPL-MCNC: 5.2 MG/DL (ref 0–0.8)
BILIRUBIN DIRECT+TOT PNL SERPL-MCNC: 7.6 MG/DL (ref 0–0.8)
BILIRUBIN DIRECT+TOT PNL SERPL-MCNC: 7.7 MG/DL (ref 0–0.5)
BUN SERPL-MCNC: 5 MG/DL (ref 9.8–20.1)
BUN SERPL-MCNC: 5 MG/DL (ref 9.8–20.1)
BUN SERPL-MCNC: 7 MG/DL (ref 9.8–20.1)
CALCIUM SERPL-MCNC: 7.3 MG/DL (ref 8.4–10.2)
CALCIUM SERPL-MCNC: 7.4 MG/DL (ref 8.4–10.2)
CALCIUM SERPL-MCNC: 8.2 MG/DL (ref 8.4–10.2)
CANCER AG19-9 SERPL-ACNC: 67.88 UNIT/ML (ref 0–37)
CHLORIDE SERPL-SCNC: 92 MMOL/L (ref 98–107)
CHLORIDE SERPL-SCNC: 93 MMOL/L (ref 98–107)
CHLORIDE SERPL-SCNC: 93 MMOL/L (ref 98–107)
CO2 SERPL-SCNC: 26 MMOL/L (ref 23–31)
CO2 SERPL-SCNC: 28 MMOL/L (ref 23–31)
CO2 SERPL-SCNC: 31 MMOL/L (ref 23–31)
CREAT SERPL-MCNC: 0.53 MG/DL (ref 0.55–1.02)
CREAT SERPL-MCNC: 0.56 MG/DL (ref 0.55–1.02)
CREAT SERPL-MCNC: 0.59 MG/DL (ref 0.55–1.02)
EOSINOPHIL # BLD AUTO: 0 X10(3)/MCL (ref 0–0.9)
EOSINOPHIL NFR BLD AUTO: 0 %
ERYTHROCYTE [DISTWIDTH] IN BLOOD BY AUTOMATED COUNT: 15.7 % (ref 11.5–14.5)
GLOBULIN SER-MCNC: 4.1 GM/DL (ref 2.4–3.5)
GLOBULIN SER-MCNC: 4.5 GM/DL (ref 2.4–3.5)
GLOBULIN SER-MCNC: 5.3 GM/DL (ref 2.4–3.5)
GLUCOSE SERPL-MCNC: 176 MG/DL (ref 82–115)
GLUCOSE SERPL-MCNC: 317 MG/DL (ref 82–115)
GLUCOSE SERPL-MCNC: 93 MG/DL (ref 82–115)
HCT VFR BLD AUTO: 32.8 % (ref 35–46)
HGB BLD-MCNC: 12.5 GM/DL (ref 12–16)
IMM GRANULOCYTES # BLD AUTO: 0.05 10*3/UL
IMM GRANULOCYTES NFR BLD AUTO: 1 %
LACTATE SERPL-SCNC: 1.7 MMOL/L (ref 0.5–2.2)
LACTATE SERPL-SCNC: 3.1 MMOL/L (ref 0.5–2.2)
LYMPHOCYTES # BLD AUTO: 0.6 X10(3)/MCL (ref 0.6–4.6)
LYMPHOCYTES NFR BLD AUTO: 7 %
MAGNESIUM SERPL-MCNC: 1.67 MG/DL (ref 1.6–2.6)
MAGNESIUM SERPL-MCNC: 1.9 MG/DL (ref 1.6–2.6)
MAGNESIUM SERPL-MCNC: 2.13 MG/DL (ref 1.6–2.6)
MCH RBC QN AUTO: 30.3 PG (ref 26–34)
MCHC RBC AUTO-ENTMCNC: 37 GM/DL (ref 31–37)
MCV RBC AUTO: 81.2 FL (ref 80–100)
MONOCYTES # BLD AUTO: 0.3 X10(3)/MCL (ref 0.1–1.3)
MONOCYTES NFR BLD AUTO: 4 %
NEUTROPHILS # BLD AUTO: 7.99 X10(3)/MCL (ref 2.1–9.2)
NEUTROPHILS NFR BLD AUTO: 89 %
PHOSPHATE SERPL-MCNC: 0.9 MG/DL (ref 2.3–4.7)
PHOSPHATE SERPL-MCNC: 23.6 MG/DL (ref 2.3–4.7)
PHOSPHATE SERPL-MCNC: 5.6 MG/DL (ref 2.3–4.7)
PLATELET # BLD AUTO: 190 X10(3)/MCL (ref 130–400)
PLATELET # BLD EST: ADEQUATE 10*3/UL
PMV BLD AUTO: 12.2 FL (ref 7.4–10.4)
POTASSIUM SERPL-SCNC: 2.4 MMOL/L (ref 3.5–5.1)
POTASSIUM SERPL-SCNC: 3.5 MMOL/L (ref 3.5–5.1)
POTASSIUM SERPL-SCNC: 5.7 MMOL/L (ref 3.5–5.1)
PROT SERPL-MCNC: 5.7 GM/DL (ref 5.8–7.6)
PROT SERPL-MCNC: 6.2 GM/DL (ref 5.8–7.6)
PROT SERPL-MCNC: 7 GM/DL (ref 5.8–7.6)
RBC # BLD AUTO: 4.04 X10(6)/MCL (ref 4–5.2)
RBC MORPH BLD: NORMAL
SODIUM SERPL-SCNC: 134 MMOL/L (ref 136–145)
SODIUM SERPL-SCNC: 135 MMOL/L (ref 136–145)
SODIUM SERPL-SCNC: 143 MMOL/L (ref 136–145)
WBC # SPEC AUTO: 9 X10(3)/MCL (ref 4.5–11)

## 2021-02-03 ENCOUNTER — HISTORICAL (OUTPATIENT)
Dept: ADMINISTRATIVE | Facility: HOSPITAL | Age: 81
End: 2021-02-03

## 2021-02-03 LAB
ABS NEUT (OLG): 8.49 X10(3)/MCL (ref 2.1–9.2)
ALBUMIN SERPL-MCNC: 1.5 GM/DL (ref 3.4–4.8)
ALBUMIN SERPL-MCNC: 1.6 GM/DL (ref 3.4–4.8)
ALBUMIN/GLOB SERPL: 0.4 RATIO (ref 1.1–2)
ALBUMIN/GLOB SERPL: 0.4 RATIO (ref 1.1–2)
ALP SERPL-CCNC: 552 UNIT/L (ref 40–150)
ALP SERPL-CCNC: 561 UNIT/L (ref 40–150)
ALT SERPL-CCNC: 192 UNIT/L (ref 0–55)
ALT SERPL-CCNC: 193 UNIT/L (ref 0–55)
AST SERPL-CCNC: 189 UNIT/L (ref 5–34)
AST SERPL-CCNC: 197 UNIT/L (ref 5–34)
BASOPHILS # BLD AUTO: 0 X10(3)/MCL (ref 0–0.2)
BASOPHILS NFR BLD AUTO: 0 %
BILIRUB SERPL-MCNC: 14.9 MG/DL
BILIRUB SERPL-MCNC: 15.5 MG/DL
BILIRUBIN DIRECT+TOT PNL SERPL-MCNC: 10.3 MG/DL (ref 0–0.5)
BILIRUBIN DIRECT+TOT PNL SERPL-MCNC: 10.7 MG/DL (ref 0–0.5)
BILIRUBIN DIRECT+TOT PNL SERPL-MCNC: 4.6 MG/DL (ref 0–0.8)
BILIRUBIN DIRECT+TOT PNL SERPL-MCNC: 4.8 MG/DL (ref 0–0.8)
BUN SERPL-MCNC: 5 MG/DL (ref 9.8–20.1)
BUN SERPL-MCNC: 5 MG/DL (ref 9.8–20.1)
CALCIUM SERPL-MCNC: 7.3 MG/DL (ref 8.4–10.2)
CALCIUM SERPL-MCNC: 7.4 MG/DL (ref 8.4–10.2)
CHLORIDE SERPL-SCNC: 101 MMOL/L (ref 98–107)
CHLORIDE SERPL-SCNC: 105 MMOL/L (ref 98–107)
CO2 SERPL-SCNC: 23 MMOL/L (ref 23–31)
CO2 SERPL-SCNC: 26 MMOL/L (ref 23–31)
CREAT SERPL-MCNC: 0.58 MG/DL (ref 0.55–1.02)
CREAT SERPL-MCNC: 0.61 MG/DL (ref 0.55–1.02)
EOSINOPHIL # BLD AUTO: 0 X10(3)/MCL (ref 0–0.9)
EOSINOPHIL NFR BLD AUTO: 0 %
ERYTHROCYTE [DISTWIDTH] IN BLOOD BY AUTOMATED COUNT: 16.9 % (ref 11.5–14.5)
GLOBULIN SER-MCNC: 3.9 GM/DL (ref 2.4–3.5)
GLOBULIN SER-MCNC: 3.9 GM/DL (ref 2.4–3.5)
GLUCOSE SERPL-MCNC: 84 MG/DL (ref 82–115)
GLUCOSE SERPL-MCNC: 91 MG/DL (ref 82–115)
HCT VFR BLD AUTO: 31.5 % (ref 35–46)
HGB BLD-MCNC: 11.1 GM/DL (ref 12–16)
IMM GRANULOCYTES # BLD AUTO: 0.1 10*3/UL
IMM GRANULOCYTES NFR BLD AUTO: 1 %
LYMPHOCYTES # BLD AUTO: 0.5 X10(3)/MCL (ref 0.6–4.6)
LYMPHOCYTES NFR BLD AUTO: 6 %
MAGNESIUM SERPL-MCNC: 1.57 MG/DL (ref 1.6–2.6)
MAGNESIUM SERPL-MCNC: 1.66 MG/DL (ref 1.6–2.6)
MCH RBC QN AUTO: 30.7 PG (ref 26–34)
MCHC RBC AUTO-ENTMCNC: 35.2 GM/DL (ref 31–37)
MCV RBC AUTO: 87.3 FL (ref 80–100)
MONOCYTES # BLD AUTO: 0.5 X10(3)/MCL (ref 0.1–1.3)
MONOCYTES NFR BLD AUTO: 5 %
NEUTROPHILS # BLD AUTO: 8.49 X10(3)/MCL (ref 2.1–9.2)
NEUTROPHILS NFR BLD AUTO: 88 %
PHOSPHATE SERPL-MCNC: 1.4 MG/DL (ref 2.3–4.7)
PHOSPHATE SERPL-MCNC: 5.6 MG/DL (ref 2.3–4.7)
PLATELET # BLD AUTO: 139 X10(3)/MCL (ref 130–400)
PMV BLD AUTO: 12.4 FL (ref 7.4–10.4)
POTASSIUM SERPL-SCNC: 4.5 MMOL/L (ref 3.5–5.1)
POTASSIUM SERPL-SCNC: 4.6 MMOL/L (ref 3.5–5.1)
PROT SERPL-MCNC: 5.4 GM/DL (ref 5.8–7.6)
PROT SERPL-MCNC: 5.5 GM/DL (ref 5.8–7.6)
RBC # BLD AUTO: 3.61 X10(6)/MCL (ref 4–5.2)
SODIUM SERPL-SCNC: 135 MMOL/L (ref 136–145)
SODIUM SERPL-SCNC: 138 MMOL/L (ref 136–145)
WBC # SPEC AUTO: 9.6 X10(3)/MCL (ref 4.5–11)

## 2021-02-04 ENCOUNTER — HISTORICAL (OUTPATIENT)
Dept: ADMINISTRATIVE | Facility: HOSPITAL | Age: 81
End: 2021-02-04

## 2021-02-04 LAB
ABS NEUT (OLG): 12.39 X10(3)/MCL (ref 2.1–9.2)
ALBUMIN SERPL-MCNC: 1.7 GM/DL (ref 3.4–4.8)
ALBUMIN/GLOB SERPL: 0.4 RATIO (ref 1.1–2)
ALP SERPL-CCNC: 609 UNIT/L (ref 40–150)
ALT SERPL-CCNC: 188 UNIT/L (ref 0–55)
AST SERPL-CCNC: 151 UNIT/L (ref 5–34)
BASOPHILS # BLD AUTO: 0 X10(3)/MCL (ref 0–0.2)
BASOPHILS NFR BLD AUTO: 0 %
BILIRUB SERPL-MCNC: 17.2 MG/DL
BILIRUBIN DIRECT+TOT PNL SERPL-MCNC: 11.5 MG/DL (ref 0–0.5)
BILIRUBIN DIRECT+TOT PNL SERPL-MCNC: 5.7 MG/DL (ref 0–0.8)
BUN SERPL-MCNC: 11 MG/DL (ref 9.8–20.1)
CALCIUM SERPL-MCNC: 7.8 MG/DL (ref 8.4–10.2)
CHLORIDE SERPL-SCNC: 103 MMOL/L (ref 98–107)
CO2 SERPL-SCNC: 21 MMOL/L (ref 23–31)
CREAT SERPL-MCNC: 0.53 MG/DL (ref 0.55–1.02)
ERYTHROCYTE [DISTWIDTH] IN BLOOD BY AUTOMATED COUNT: 16.4 % (ref 11.5–14.5)
GLOBULIN SER-MCNC: 4.6 GM/DL (ref 2.4–3.5)
GLUCOSE SERPL-MCNC: 96 MG/DL (ref 82–115)
HCT VFR BLD AUTO: 32.6 % (ref 35–46)
HGB BLD-MCNC: 12 GM/DL (ref 12–16)
IMM GRANULOCYTES # BLD AUTO: 0.13 10*3/UL
IMM GRANULOCYTES NFR BLD AUTO: 1 %
LYMPHOCYTES # BLD AUTO: 0.5 X10(3)/MCL (ref 0.6–4.6)
LYMPHOCYTES NFR BLD AUTO: 4 %
MAGNESIUM SERPL-MCNC: 1.69 MG/DL (ref 1.6–2.6)
MCH RBC QN AUTO: 30.8 PG (ref 26–34)
MCHC RBC AUTO-ENTMCNC: 36.8 GM/DL (ref 31–37)
MCV RBC AUTO: 83.8 FL (ref 80–100)
MONOCYTES # BLD AUTO: 0.3 X10(3)/MCL (ref 0.1–1.3)
MONOCYTES NFR BLD AUTO: 2 %
NEUTROPHILS # BLD AUTO: 12.39 X10(3)/MCL (ref 2.1–9.2)
NEUTROPHILS NFR BLD AUTO: 93 %
PHOSPHATE SERPL-MCNC: 2.7 MG/DL (ref 2.3–4.7)
PLATELET # BLD AUTO: 151 X10(3)/MCL (ref 130–400)
PMV BLD AUTO: 12.8 FL (ref 7.4–10.4)
POTASSIUM SERPL-SCNC: 4.8 MMOL/L (ref 3.5–5.1)
PROT SERPL-MCNC: 6.3 GM/DL (ref 5.8–7.6)
RBC # BLD AUTO: 3.89 X10(6)/MCL (ref 4–5.2)
SARS-COV-2 AG RESP QL IA.RAPID: NEGATIVE
SODIUM SERPL-SCNC: 136 MMOL/L (ref 136–145)
WBC # SPEC AUTO: 13.3 X10(3)/MCL (ref 4.5–11)

## 2021-02-05 LAB
ABS NEUT (OLG): 10.4 X10(3)/MCL (ref 2.1–9.2)
ALBUMIN SERPL-MCNC: 1.7 GM/DL (ref 3.4–4.8)
ALBUMIN/GLOB SERPL: 0.4 RATIO (ref 1.1–2)
ALP SERPL-CCNC: 518 UNIT/L (ref 40–150)
ALT SERPL-CCNC: 161 UNIT/L (ref 0–55)
APTT PPP: 33 SECOND(S) (ref 23.2–33.7)
AST SERPL-CCNC: 128 UNIT/L (ref 5–34)
BASOPHILS # BLD AUTO: 0 X10(3)/MCL (ref 0–0.2)
BASOPHILS NFR BLD AUTO: 0 %
BILIRUB SERPL-MCNC: 16.4 MG/DL
BILIRUBIN DIRECT+TOT PNL SERPL-MCNC: 11.8 MG/DL (ref 0–0.5)
BILIRUBIN DIRECT+TOT PNL SERPL-MCNC: 4.6 MG/DL (ref 0–0.8)
BUN SERPL-MCNC: 17.6 MG/DL (ref 9.8–20.1)
CALCIUM SERPL-MCNC: 7.5 MG/DL (ref 8.4–10.2)
CHLORIDE SERPL-SCNC: 107 MMOL/L (ref 98–107)
CO2 SERPL-SCNC: 21 MMOL/L (ref 23–31)
CREAT SERPL-MCNC: 0.75 MG/DL (ref 0.55–1.02)
EOSINOPHIL # BLD AUTO: 0.2 X10(3)/MCL (ref 0–0.9)
EOSINOPHIL NFR BLD AUTO: 2 %
ERYTHROCYTE [DISTWIDTH] IN BLOOD BY AUTOMATED COUNT: 17.2 % (ref 11.5–17)
GLOBULIN SER-MCNC: 3.8 GM/DL (ref 2.4–3.5)
GLUCOSE SERPL-MCNC: 84 MG/DL (ref 82–115)
HCT VFR BLD AUTO: 30.6 % (ref 37–47)
HGB BLD-MCNC: 11 GM/DL (ref 12–16)
INR PPP: 1.3 (ref 0–1.3)
LYMPHOCYTES # BLD AUTO: 0.5 X10(3)/MCL (ref 0.6–4.6)
LYMPHOCYTES NFR BLD AUTO: 4 %
MCH RBC QN AUTO: 31 PG (ref 27–31)
MCHC RBC AUTO-ENTMCNC: 35.9 GM/DL (ref 33–36)
MCV RBC AUTO: 86.2 FL (ref 80–94)
MONOCYTES # BLD AUTO: 0.6 X10(3)/MCL (ref 0.1–1.3)
MONOCYTES NFR BLD AUTO: 5 %
NEUTROPHILS # BLD AUTO: 10.4 X10(3)/MCL (ref 2.1–9.2)
NEUTROPHILS NFR BLD AUTO: 88 %
PLATELET # BLD AUTO: 147 X10(3)/MCL (ref 130–400)
PLATELET # BLD AUTO: 157 X10(3)/MCL (ref 130–400)
PMV BLD AUTO: 12 FL (ref 9.4–12.4)
POTASSIUM SERPL-SCNC: 3.9 MMOL/L (ref 3.5–5.1)
PROT SERPL-MCNC: 5.5 GM/DL (ref 5.8–7.6)
PROTHROMBIN TIME: 16.3 SECOND(S) (ref 11.1–13.7)
RBC # BLD AUTO: 3.55 X10(6)/MCL (ref 4.2–5.4)
SODIUM SERPL-SCNC: 140 MMOL/L (ref 136–145)
WBC # SPEC AUTO: 11.8 X10(3)/MCL (ref 4.5–11.5)

## 2021-02-06 LAB
ABS NEUT (OLG): 9.31 X10(3)/MCL (ref 2.1–9.2)
BASOPHILS # BLD AUTO: 0 X10(3)/MCL (ref 0–0.2)
BASOPHILS NFR BLD AUTO: 0 %
BUN SERPL-MCNC: 28.7 MG/DL (ref 9.8–20.1)
CALCIUM SERPL-MCNC: 7.6 MG/DL (ref 8.4–10.2)
CHLORIDE SERPL-SCNC: 110 MMOL/L (ref 98–107)
CO2 SERPL-SCNC: 18 MMOL/L (ref 23–31)
CREAT SERPL-MCNC: 1.39 MG/DL (ref 0.55–1.02)
CREAT/UREA NIT SERPL: 21
ERYTHROCYTE [DISTWIDTH] IN BLOOD BY AUTOMATED COUNT: 18.1 % (ref 11.5–17)
GLUCOSE SERPL-MCNC: 79 MG/DL (ref 82–115)
HCT VFR BLD AUTO: 27.2 % (ref 37–47)
HGB BLD-MCNC: 9.6 GM/DL (ref 12–16)
LYMPHOCYTES # BLD AUTO: 0.4 X10(3)/MCL (ref 0.6–4.6)
LYMPHOCYTES NFR BLD AUTO: 4 %
MCH RBC QN AUTO: 31.1 PG (ref 27–31)
MCHC RBC AUTO-ENTMCNC: 35.3 GM/DL (ref 33–36)
MCV RBC AUTO: 88 FL (ref 80–94)
MONOCYTES # BLD AUTO: 0.4 X10(3)/MCL (ref 0.1–1.3)
MONOCYTES NFR BLD AUTO: 4 %
NEUTROPHILS # BLD AUTO: 9.31 X10(3)/MCL (ref 2.1–9.2)
NEUTROPHILS NFR BLD AUTO: 91 %
PLATELET # BLD AUTO: 122 X10(3)/MCL (ref 130–400)
PMV BLD AUTO: 12.4 FL (ref 9.4–12.4)
POTASSIUM SERPL-SCNC: 3.7 MMOL/L (ref 3.5–5.1)
RBC # BLD AUTO: 3.09 X10(6)/MCL (ref 4.2–5.4)
SODIUM SERPL-SCNC: 142 MMOL/L (ref 136–145)
WBC # SPEC AUTO: 10.2 X10(3)/MCL (ref 4.5–11.5)

## 2021-02-09 LAB
FINAL CULTURE: NORMAL
FINAL CULTURE: NORMAL

## 2022-09-13 NOTE — HISTORICAL [facility] CERNER
This is a historical note converted from [employer]. Formatting and pictures may have been removed.  Please reference [employer] for original formatting and attached multimedia. Chief Complaint  vomiting since Friday, , last time yesterday, hurts all over. ?had diarrhea, also, yesterday had a normal BM; c/o of abdominal pain upper left side  History of Present Illness  78yoF with pmh AV-block, CAD with pacemaker who presents complaining of 4 days of nausea and vomiting. She also has associated upper abdominal pain across her upper abdomen and radiating to her back. Her abdominal pain comes and goes and has resolved since shes been in the ER. Patient has a history of GERD and states this pain is different and has not experienced this type of pain in the past. She also reports diarrhea. She denies any hematemesis, hematochezia, jaundice, scleral icterus, pruritis, tea colored urine, or acholic stools. She denies fevers but reports feeling sweaty over the past several days. She denies chest pain, SOB.  Review of Systems  Negative except as described in HPI.  Physical Exam  Vitals & Measurements  T:?36.4? ?C (Oral)? HR:?63(Peripheral)? RR:?18? BP:?125/60? SpO2:?97%?  Gen - well appearing, resting in bed, nad  Neuro - alert and oriented  Head - atraumatic, normocephalic  Pulm - normal wob, no respiratory distress on room air  Abd - soft, nd, mildly ttp b/l upper quadrants R>L, no [place], no rebound or guarding  Ext - moves all spontaneously, no peripheral edema  Skin - warm and dry, no jaundice  Assessment/Plan  Cholelithiasis?K80.20  ?78yoF with pmh heart disease with pacemaker presents with symptomatic cholelithiasis without evidence of cholecystitis. She also has an elevated TBili and acute symptoms which have resolved which suggests possible resolved choledocholithiasis. Bile duct was not dilated on RUQ US today. AF, HDS, without?leukocytosis. Given patients multiple comorbidities, we recommend laparoscopic cholecystectomy  +/-IOC +/- common duct exploration?on a semi-urgent outpatient basis. Have spoken with cardiology on call who performed a chart review of the patient and feel patient is safe for surgery given recent cardiac work-up within the past year which revealed no ischemia and LVEF 60%.  ?  -- written informed consent obtained from patient for surgery, r/b/i discussed and all questions answered  -- patient will attend [facility] clinic today  -- plan for OR Wednesday 8/14  -- CMP pre-op  -- heparin and ancef on call to OR  ?  Vangie Jeff, HO1   Problem List/Past Medical History  Ongoing  CAD - Coronary artery disease  Cardiac pacemaker  Defibrillator  Essential hypertension  HTN (hypertension)  Hyperlipidemia  Impingement syndrome of right shoulder  Impingement syndrome of right shoulder  Knowledge deficit  Obesity  Pacemaker  Historical  MI (myocardial infarction)  Procedure/Surgical History  Insertion of Pacemaker, Dual Chamber into Chest Subcutaneous Tissue and Fascia, Open Approach (11/07/2018)  Removal of Cardiac Rhythm Related Device from Trunk Subcutaneous Tissue and Fascia, Open Approach (11/07/2018)  Removal of permanent pacemaker pulse generator with replacement of pacemaker pulse generator; dual lead system (11/07/2018)  Pacemaker (06/18/2010)  Breast cancer  Closure of fistula of vocal cord  LEANN BSO - Total abdominal hysterectomy and bilateral salpingo-oophorectomy  vocal cord surgery x 2   Medications  Inpatient  No active inpatient medications  Home  Advair HFA 45 mcg-21 mcg/inh inhalation aerosol, 2 puff(s), INH, BID, 5 refills  alendronate 70 mg oral tablet, 70 mg= 1 tab(s), Oral, qWeek, 3 refills  ALPHAGAN P SOL 0.1%  amLODIPine 5 mg oral tablet, 5 mg= 1 tab(s), Oral, Daily, 6 refills  Aspir 81 oral delayed release tablet, 81 mg= 1 tab(s), Oral, Daily, 3 refills  atorvastatin 40 mg oral tablet, 40 mg= 1 tab(s), Oral, At Bedtime, 3 refills,? ?Not Taking per Prescriber  carvedilol 6.25 mg oral tablet, 6.25 mg= 1  tab(s), Oral, BID, 3 refills  COMBIGAN SOL 0.2/0.5%  DORZOL/TIMOL SOL 22.3-6.8, 1 drop(s), OPTH, BID  Flonase 50 mcg/inh nasal spray, 1 spray(s), Nasal, Daily, 6 refills  LATANOPROST SOL 0.005%  meloxicam 7.5 mg oral tablet, 7.5 mg= 1 tab(s), Oral, Daily, PRN  NexIUM 40 mg oral delayed release capsule, 40 mg= 1 cap(s), Oral, Daily, 3 refills  nitroglycerin 0.4 mg sublingual TAB, 0.4 mg= 1 tab(s), SL, q5min, 6 refills  Saline Mist 0.65% nasal spray  simvastatin 40 mg oral tablet, 40 mg= 1 tab(s), Oral, qPM  Voltaren 1% topical gel, 1 marysol, TOP, QID, 11 refills  Zofran ODT 4 mg oral tablet, disintegrating, 4 mg= 1 tab(s), Oral, q6hr, PRN  Allergies  Nuts  Seafood  penicillins  Social History  Abuse/Neglect  No, 08/12/2019  Alcohol - Denies Alcohol Use, 09/19/2014  Never, 11/01/2018  Never, 06/26/2015  Employment/School  disabled, Operates hazardous equipment: No., 11/02/2016  Exercise  Exercise frequency: Daily. Self assessment: Poor condition. Exercise type: Walking., 05/04/2015  Home/Environment  Lives with Alone. Living situation: Home/Independent., 11/02/2016  Nutrition/Health  Regular, portion control, 04/07/2015  Sexual  Sexually active: No. Sexual orientation: Straight or heterosexual. Gender Identity Identifies as female., 06/18/2019  Substance Use - Denies Substance Abuse, 09/19/2014  Never, 06/26/2015  Tobacco - Denies Tobacco Use, 09/19/2014  Never (less than 100 in lifetime), N/A, 08/12/2019  Never (less than 100 in lifetime), Cigarettes, No, 06/18/2019  Never (less than 100 in lifetime), N/A, 06/04/2019  Never (less than 100 in lifetime), N/A, 05/01/2019  Family History  Primary malignant neoplasm of colon: Mother.  Primary malignant neoplasm of prostate: Father.  Throat cancer.: Sister and Brother.  Immunizations  Vaccine Date Status   influenza virus vaccine, inactivated 11/01/2018 Given   influenza virus vaccine, inactivated 11/02/2016 Given   influenza virus vaccine, inactivated 11/02/2015 Given    influenza virus vaccine, inactivated 12/08/2014 Recorded   pneumococcal vacc 10/02/2009 Recorded   Lab Results  K 3.2, WBC 6.8, TBili 6.8, DBili 0.4, Indirect Bili 2.4  Diagnostic Results  US Abdomen Limited  ?   ?FINDINGS: The liver measures 12.6 cm in length with mildly  heterogeneous echogenicity. Patent main portal vein with normal flow  direction.  ?  Poor visualization of the pancreas. The superior IVC has normal  caliber.  ?  The right kidney measures about 7.6 cm in length. No hydronephrosis.  ?  Several shadowing gallstones with some gallbladder sludge. No  gallbladder wall thickening or ascites identified. Common bile duct  measures 4 to 5 mm in diameter.?  ?  IMPRESSION:?  1. Cholelithiasis with no biliary dilatation or sonographic evidence  of cholecystitis.  2. Mild hepatic steatosis.  ?  ?  ?   EXAMINATION: CT of the abdomen and pelvis with contrast  ?  ?  ?  IMPRESSION:?  ?  No acute process of the abdomen or pelvis. Cholelithiasis without CT  evidence for cholecystitis.  ?  ?  ?  XR Chest 1 View  ?  REASON FOR STUDY: Chest Pain  ?  COMPARISON: Radiographs from 3/11/2019  ?  FINDINGS: Right-sided dual-lead pacemaker. Cardiac silhouette is  within normal limits. The lungs are well-inflated. No consolidation  identified. No significant pleural effusion or discernible  pneumothorax. ? ?  ?  IMPRESSION: No acute pulmonary process identified. ? ?  ?  ?      Patient met in preoperative area. Confirms the above history and denies changes in her health since she was last seen on Monday. Abdominal pain has resolved and she has no further nausea or vomiting. Denies f/c, jaundice, SOB, CP. Will fu CMP this morning, TBili Monday 2.8 with direct component of 0.4. To OR today for lap cholecystectomy possible IOC possible common duct exploration.  ?  Vangie Jeff, HO1

## 2022-09-13 NOTE — HISTORICAL [facility] CERNER
This is a historical note converted from [employer]. Formatting and pictures may have been removed.  Please reference [employer] for original formatting and attached multimedia. Chief Complaint  history of stroke history of falls not on atorvastatin Patient insists she took flu vaccine 9/9/2020  History of Present Illness  80 yo bf with recent rx for syphilis? not recently sexually activehx cva vit d level low low back pain  Review of Systems  negcv, neg pum, neg gi gu ro as in hpi  Physical Exam  Vitals & Measurements  T:?36.5? ?C (Oral)? HR:?63(Peripheral)? RR:?18? BP:?135/71?  HT:?150.00?cm? WT:?48.600?kg? BMI:?21.6?  aax4 nad  lino eomi  cv rrr s1s2 no mgr  lungs cta no cr or whz  abd nt soft  ext no edema  neuro intact  Assessment/Plan  1.?Breast cancer?C50.919  ?remission  ?  2.?CAD - Coronary artery disease?I25.10  ?sees cards  ?  3.?Essential hypertension?I10,?HTN (hypertension)?I10  ?controlled  Ordered:  CBC w/ Auto Diff, Routine collect, *Est. 04/15/21 3:00:00 CDT, Blood, Order for future visit, *Est. Stop date 04/15/21 3:00:00 CDT, Lab Collect, Essential hypertension, 10/15/20 11:22:00 CDT  Clinic Follow up, *Est. 04/15/21 3:00:00 CDT, Order for future visit, Essential hypertension, [facility]  Comprehensive Metabolic Panel, Routine collect, *Est. 04/15/21 3:00:00 CDT, Blood, Order for future visit, *Est. Stop date 04/15/21 3:00:00 CDT, Lab Collect, Essential hypertension, 10/15/20 11:22:00 CDT  Free T4, Routine collect, *Est. 04/15/21 3:00:00 CDT, Blood, Order for future visit, *Est. Stop date 04/15/21 3:00:00 CDT, Lab Collect, Essential hypertension, 10/15/20 11:22:00 CDT  Hemoglobin A1C [facility], Routine collect, *Est. 04/15/21 3:00:00 CDT, Blood, Order for future visit, *Est. Stop date 04/15/21 3:00:00 CDT, Lab Collect, Essential hypertension, 10/15/20 11:23:00 CDT  Lipid Panel, Routine collect, *Est. 04/15/21 3:00:00 CDT, Blood, Order for future visit, *Est. Stop date 04/15/21 3:00:00 CDT, Lab Collect,  Essential hypertension, 10/15/20 11:22:00 CDT  Thyroid Stimulating Hormone, Routine collect, *Est. 04/15/21 3:00:00 CDT, Blood, Order for future visit, *Est. Stop date 04/15/21 3:00:00 CDT, Lab Collect, Essential hypertension, 10/15/20 11:22:00 CDT  ?  4.?HLD (hyperlipidemia)?E78.5  ?flp  ?  Orders:  Office/Outpatient Visit Level 4 Established [id number] PC, Back pain, [facility], 10/15/20 11:22:00 CDT  XR Spine Lumbar 2 or 3 Views, Routine, 10/15/20 11:22:00 CDT, None, Patient Bed, Patient Has IV?, Rad Type, Order for future visit, Back pain, Not Scheduled, 10/15/20 11:22:00 CDT  Referrals  Clinic Follow up, *Est. 04/15/21 3:00:00 CDT, Order for future visit, Essential hypertension, [facility]   Problem List/Past Medical History  Ongoing  Abnormal gait  Breast cancer  CAD - Coronary artery disease  Cardiac pacemaker  Defibrillator  Essential hypertension  HLD (hyperlipidemia)  HTN (hypertension)  Hyperlipidemia  Impingement syndrome of right shoulder  Impingement syndrome of right shoulder  Knowledge deficit  Memory loss  Mild cognitive impairment  Obesity  Pacemaker  Historical  MI (myocardial infarction)  Procedure/Surgical History  Cholangiogram (Surgery) (None) (08/14/2019)  Cholecystectomy Laparoscopic (None) (08/14/2019)  Laparoscopy, surgical; cholecystectomy with cholangiography (08/14/2019)  Resection of Gallbladder, Percutaneous Endoscopic Approach (08/14/2019)  Insertion of Pacemaker, Dual Chamber into Chest Subcutaneous Tissue and Fascia, Open Approach (11/07/2018)  Removal of Cardiac Rhythm Related Device from Trunk Subcutaneous Tissue and Fascia, Open Approach (11/07/2018)  Removal of permanent pacemaker pulse generator with replacement of pacemaker pulse generator; dual lead system (11/07/2018)  Pacemaker (06/18/2010)  Breast cancer  Closure of fistula of vocal cord  LEANN BSO - Total abdominal hysterectomy and bilateral salpingo-oophorectomy  vocal cord surgery x 2   Medications  Advair HFA 45 mcg-21  mcg/inh inhalation aerosol, 2 puff(s), INH, BID, 5 refills  alendronate 70 mg oral tablet, See Instructions  amlodipine 5 mg oral tablet, See Instructions, 6 refills  aspirin 81 mg oral Delayed Release (EC) tablet, 81 mg= 1 tab(s), Oral, Daily, 3 refills  carvedilol 3.125 mg oral tablet, See Instructions, 6 refills  COMBIGAN SOL 0.2/0.5%  DORZOL/TIMOL SOL 22.3-6.8, 1 drop(s), OPTH, BID  Flonase 50 mcg/inh nasal spray, 1 spray(s), Nasal, Daily, 6 refills  ketoconazole 2% topical cream, 1 marysol, TOP, Daily  LATANOPROST SOL 0.005%  Nexium 20 mg oral delayed release capsule, See Instructions  Nexium 20 mg oral delayed release capsule, See Instructions  nitroglycerin 0.4 mg sublingual TAB, 0.4 mg= 1 tab(s), SL, q5min, 5 refills  Pantoprazole 40 mg ORAL EC-Tablet, 40 mg= 1 tab(s), Oral, Daily, 2 refills,? ?Not Taking per Prescriber: Last Dose Date/Time Unknown  simvastatin 40 mg oral tablet, 40 mg= 1 tab(s), Oral, Once a day (at bedtime), 6 refills  Tylenol 8 HR Arthritis Pain 650 mg oral tablet, extended release, 650 mg= 1 tab(s), Oral, BID, 3 refills  Vitamin D3 5000 intl units (125 mcg) oral capsule, 5000 IntUnit= 1 cap(s), Oral, Daily, 3 refills  Allergies  Nuts  Seafood  penicillins  Social History  Abuse/Neglect  No, No, Yes, 10/15/2020  Alcohol - Denies Alcohol Use, 09/19/2014  Never, 11/01/2018  Never, 06/26/2015  Employment/School  Disabled, 08/29/2019  disabled, Operates hazardous equipment: No., 11/02/2016  Exercise  Exercise frequency: Daily. Self assessment: Poor condition. Exercise type: Walking., 05/04/2015  Home/Environment  Lives with Alone. Living situation: Home/Independent., 08/29/2019  Nutrition/Health  Regular, portion control, 04/07/2015  Sexual  Sexually active: No. Sexual orientation: Straight or heterosexual. Gender Identity Identifies as female., 06/18/2019  Spiritual/Cultural  Religion, 02/18/2020  Substance Use - Denies Substance Abuse, 09/19/2014  Never, 06/26/2015  Tobacco - Denies Tobacco  Use, 09/19/2014  Never (less than 100 in lifetime), N/A, 10/15/2020  Family History  Primary malignant neoplasm of colon: Mother.  Primary malignant neoplasm of prostate: Father.  Throat cancer.: Sister and Brother.  Immunizations  Vaccine Date Status   pneumococcal 13-valent conjugate vaccine 07/21/2020 Given   influenza virus vaccine, inactivated 11/01/2018 Given   influenza virus vaccine, inactivated 11/02/2016 Given   influenza virus vaccine, inactivated 11/02/2015 Given   influenza virus vaccine, inactivated 12/08/2014 Recorded   pneumococcal vacc 10/02/2009 Recorded   Health Maintenance  Health Maintenance  ???Pending?(in the next year)  ??? ??OverDue  ??? ? ? ?Coronary Artery Disease Maintenance-Antiplatelet Agent Prescribed due??and every?  ??? ? ? ?Pneumococcal Vaccine due??and every?  ??? ? ? ?HF-LVEF due??03/29/18??and every 1??year(s)  ??? ? ? ?Influenza Vaccine due??10/01/19??and every 1??day(s)  ??? ? ? ?HF-Heart Failure Education due??08/11/20??and every 1??year(s)  ??? ? ? ?Coronary Artery Disease Maintenance-Electrocardiogram due??08/14/20??and every 1??year(s)  ??? ??Due?  ??? ? ? ?Hypertension Management-Education due??10/15/20??and every 1??year(s)  ??? ? ? ?Medicare Annual Wellness Exam due??10/15/20??and every 1??year(s)  ??? ? ? ?Tetanus Vaccine due??10/15/20??and every 10??year(s)  ??? ? ? ?Zoster Vaccine due??10/15/20??and every?  ??? ??Due In Future?  ??? ? ? ?Obesity Screening not due until??01/01/21??and every 1??year(s)  ??? ? ? ?Advance Directive not due until??01/02/21??and every 1??year(s)  ??? ? ? ?Cognitive Screening not due until??01/02/21??and every 1??year(s)  ??? ? ? ?Fall Risk Assessment not due until??01/02/21??and every 1??year(s)  ??? ? ? ?Functional Assessment not due until??01/02/21??and every 1??year(s)  ??? ? ? ?Aspirin Therapy for CVD Prevention not due until??02/27/21??and every 1??year(s)  ??? ? ? ?Hypertension Management-BMP not due until??06/08/21??and every  1??year(s)  ??? ? ? ?Coronary Artery Disease Maintenance-BMP not due until??06/08/21??and every 1??year(s)  ??? ? ? ?Bone Density Screening not due until??06/18/21??and every 2??year(s)  ???Satisfied?(in the past 1 year)  ??? ??Satisfied?  ??? ? ? ?ADL Screening on??10/15/20.??Satisfied by Geradr OWUSU, Андрей Aerial  ??? ? ? ?Advance Directive on??10/15/20.??Satisfied by Gee LPZEESHAN, Андрей Aerial  ??? ? ? ?Aspirin Therapy for CVD Prevention on??02/27/20.??Satisfied by Dariana Calzada MD  ??? ? ? ?Blood Pressure Screening on??10/15/20.??Satisfied by Gerard OWUSU, Андрей Aerial  ??? ? ? ?Body Mass Index Check on??10/15/20.??Satisfied by Gerard OWUSU, Андрей Aerial  ??? ? ? ?Breast Cancer Screening on??12/05/19.??Satisfied by Karena Carson  ??? ? ? ?Cognitive Screening on??10/15/20.??Satisfied by Gerard OWUSU, Андрей Aerial  ??? ? ? ?Coronary Artery Disease Maintenance-Lipid Lowering Therapy on??09/09/20.??Satisfied by Peter Singh  ??? ? ? ?Depression Screening on??10/15/20.??Satisfied by Gerard LPZEESHAN, Андрей Aerial  ??? ? ? ?Diabetes Screening on??03/05/20.??Satisfied by Anabel Villareal.  ??? ? ? ?Fall Risk Assessment on??09/09/20.??Satisfied by Anabel Hi LPN  ??? ? ? ?Functional Assessment on??10/15/20.??Satisfied by Gee LPZEESHAN, Андрей Aerial  ??? ? ? ?Hypertension Management-Blood Pressure on??10/15/20.??Satisfied by Gee LPN, Андрей Aerial  ??? ? ? ?Lipid Screening on??03/05/20.??Satisfied by Anabel Villareal  ??? ? ? ?Obesity Screening on??10/15/20.??Satisfied by нАдрей Gee LPN  ??? ? ? ?Pneumococcal Vaccine on??07/21/20.??Satisfied by Madeleine Flaherty LPN  ?

## 2022-09-13 NOTE — HISTORICAL [facility] CERNER
This is a historical note converted from [employer]. Formatting and pictures may have been removed.  Please reference [employer] for original formatting and attached multimedia. 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 -Hypophosphatemia, hypomagnesemia, hypocalcemia, hypokalemia  Total hyperbilirubinemia, high direct and indirect  Acute transaminitis  Elevated CA 19-9 at 67.88.  Borderline hypotension 2/2 dehydration, resolving  Secondary hyperparathyroidism 2/2 severe vit d deficiency (lvl 3.6)  Anion gap metabolic acidosis, resolved  Hypoglycemia  AV block s/p pacemaker in 2010  Nonobstructive CAD  HTN  History of breast cancer s/p chemo  History of falls  ?  Surgical Procedures  EUS (2/3/21)  ERCP - failed to place biliary stent (2/3/21)  Immunizations  No immunizations recorded for this visit.  Hospital Course  Patient is a 80-year-old female with?history of nonobstructive CAD, AV block post pacemaker in 2010, hypertension, history of breast cancer status post chemo, mild cognitive impairment, history of treated upper extremity DVT presenting to ER on 2/1/2021 with complaints of fatigue, weakness, nonproductive cough, malnutrition and subjective unquantified weight loss. ?According to patient, all of the symptoms were as a result of her first Covid vaccine dose about 1 to 2 weeks ago prior to admission at [facility] in [facility]. ?Patient was brought in by her son who reported that she had teary rated significantly from her baseline. ?On admission labs she was found to have begin transaminitis with , , alk phos 749, bilirubin 18.7, direct bilirubin 12.6, indirect bilirubin  6.1 despite cholecystectomy in 2019. ?All of her primary liver/gallbladder work-up largely came back negative but CT abdomen was significant for severe intrahepatic bile duct dilatation with moderate dilatation of pancreatic duct suspicious for primary pancreatic head neoplasm or possibly a cholangiocarcinoma involving the distal CBD. ?In short, patient had painless obstructive jaundice. ?Patient was taken to [facility] for EUS and ERCP on 2/3/2021 in hopes of placing biliary stent. ?EUS revealed grade a reflux esophagitis, mass in pancreatic head with preliminary diagnosis of adenocarcinoma staged T2 N0 M0 by endosonographic criteria, pancreatic duct dilated up to 5 mm in diameter in pancreatic head, dilatation and CBD measuring 14 mm, 1 lymph node and celiac region. ?But upon attempting to insert biliary stent, ERCP was aborted due to failed cannulation. ?Patient was transferred back to [facility] in anticipation of transfer back to [facility] for IR services for placement of biliary drain. ?But given the fact that patient would receive biliary drain with IR and ERCP with GI at [facility], understandably so, ?it was deemed in the best interest of the patient, ?that patient should be transferred to [facility]. ?Plan is to place stent in 1 week. ?  ?  During patients hospital stay, she had significant refeeding syndrome and received multiple rounds of potassium, calcium, phosphate, magnesium supplementation. ?On day of transfer likely, all of her electrolytes were suitable and she did not need significant repletion. ?Of note, lab work drawn on 2/2/2021 at 1355 with Phos of 23.6 was a laboratory drawing error when in actuality patients phosphorus upon redraw was 5.6. ?Hospital stay patient was also found to have elevated CA 19-9 at 67.88. ?She was also treated for borderline hypotension secondary to dehydration and malnutrition which was resolving with adequate IVF repletion with normal saline at 150 cc/h. ?Given that she was hypocalcemic,  subsequent labs revealed secondary hyperparathyroidism 2/2 severe vitamin D deficiency at a level of 3.6. She was provided 50,000 vitamin D oral supplementation. ?She is to receive vitamin D 50,000 units weekly, and given that her albumin was less than 2, will need to follow-up with ionized calcium level. ?Nutritionist was consulted and was provided recommendation for Megace daily plus starting peripheral nutrition: supplemental PPN Clinimix 4.25/5 @ 45ml/hr + 250ml 20% lipids every other day to provide 617 calories, 46g protein.?  ?   Per physical therapy recommendations, patient needs SNF placement but patient and her family was adamant for discharge home to son Srinivasa [phone]. ?It is felt that patient would likely benefit from home hospice until she is evaluated by oncology and surgical oncology, but ideally if possible patient should go to a nursing care facility if family and patient herself is willing to do so.  ?  Time Spent on discharge  >35 minutes  Objective  Vitals & Measurements  T:?36.3? ?C (Oral)? TMIN:?36.2? ?C (Axillary)? TMAX:?36.3? ?C (Oral)? HR:?66(Peripheral)? RR:?20? BP:?122/71? SpO2:?98%?  Physical Exam  General:?frail, thin, in no acute distress  Eye: with scleral icterus, EOMI  HENT:?normocephalic, atraumatic; hematoma on right eyelid resolving  Respiratory:?clear to auscultation bilaterally  Cardiovascular:?regular rate and rhythm without murmurs, gallops or rubs; no peripheral edema  Gastrointestinal:?soft, non-tender, non-distended with normal bowel sounds, without masses to palpation  Integumentary: icterus  Neurologic:??no signs of peripheral neurological deficit, motor/sensory function intact, alert and oriented  Patient Discharge Condition  clinically stable for transfer to [facility]  Discharge Disposition  1) please ensure patient has followup appointments scheduled with Dr Brown, and appointments with surgical oncology in [facility]  2) please ensure patients family  understands the gravity and burden of her disease, and if possible, hospice care in a facility or SNF versus home health or home hospice  3) replete vitamin d 07723 units weekly, recheck in 6 weeks, and start vit d 1000 units daily afterwards if levels adequate  4)   Discharge Medication Reconciliation  Prescribed  megestrol (Megace 40 mg), Oral, BID  peripheral nutrition: supplemental PPN Clinimix 4.25/5 @ 45ml/hr + 250ml 20% lipids every other day to provide 617 calories, 46g protein.?  ?  Continue  Misc Prescription (Keith Donis)?See Instructions  alendronate (alendronate 70 mg oral tablet)?See Instructions  aspirin (aspirin 81 mg oral Delayed Release (EC) tablet)?81 mg, Oral, Daily  brimonidine-timolol ophthalmic (COMBIGAN ? ? SOL 0.2/0.5%)  cholecalciferol (Vitamin D3 5000 intl units (125 mcg) oral capsule)?5,000 IntUnit, Oral, Daily  docusate (Dulcolax Stool Softener 100 mg oral capsule)?100 mg, Oral, BID, PRN for constipation  dorzolamide-timolol ophthalmic (DORZOL/TIMOL SOL 22.3-6.8)?1 drop(s), OPTH, BID  esomeprazole (Nexium 20 mg oral delayed release capsule)?See Instructions  fluticasone nasal (Flonase 50 mcg/inh nasal spray)?1 spray(s), Nasal, Daily  fluticasone-salmeterol (Advair HFA 45 mcg-21 mcg/inh inhalation aerosol)?2 puff(s), INH, BID  ketoconazole topical (ketoconazole 2% topical cream)?1 marysol, TOP, Daily  latanoprost ophthalmic (LATANOPROST ?SOL 0.005%)  magnesium hydroxide (Milk of Magnesia 8% oral susp)?1.2 gm, Oral, BID, PRN for constipation  nitroglycerin (nitroglycerin 0.4 mg sublingual TAB)?0.4 mg, SL, q5min  pantoprazole (Pantoprazole 40 mg ORAL EC-Tablet)?40 mg, Oral, Daily  Discontinue  acetaminophen (Tylenol 8 HR Arthritis Pain 650 mg oral tablet, extended release)?650 mg, Oral, BID  amLODIPine (amlodipine 5 mg oral tablet)?5 mg, Oral, Daily  carvedilol (carvedilol 3.125 mg oral tablet)?3.125 mg, Oral, BID  codeine-guaifenesin (codeine-guaifenesin 10 mg-100 mg/5 mL oral syrup)?5 mL,  Oral, q6hr  diclofenac topical (Voltaren 1% topical gel)?1 marysol, TOP, QID  levoFLOXacin (levofloxacin 750 mg oral tablet)?750 mg, Oral, Daily  simvastatin (simvastatin 40 mg oral tablet)?40 mg, Oral, Once a day (at bedtime)  traMADol (traMADol 50 mg oral tablet)?50 mg, Oral, q4hr, PRN for pain  Education and Orders Provided  Discharge - 02/04/21 16:41:00 CST, Dis/Trn to [facility] as Inpatient, d/c to [facility]?  Follow up  transferred, appointments with surgical oncology and GI to follow  Car Seat Challenge  No Qualifying Data      I was present with the Resident during discharge day management.  ???  x[ ] I discussed the case with the Resident and agree with the discharge plan as above.  [ ] I discussed the case with the Resident and agree with the discharge plan as above except:  ???  Time spent on [id number] [ ] minutes

## 2022-09-13 NOTE — HISTORICAL [facility] CERNER
This is a historical note converted from Cerryan. Formatting and pictures may have been removed.  Please reference C[employer]an for original formatting and attached multimedia. Chief Complaint  reports having pneumonia then had covid vaccine and hasnt been feeling well since. reports loss of taste. productive cough. also reports different color urine and stool.  Patient is somewhat a poor historian?in the sense?that you cannot understand what she is saying?due to?having no teeth  History of Present Illness  Patient is a 80-year-old -American female?with PMH?of CAD s/p?pacemaker, hypertension, hyperlipidemia, mild cognitive impairment?who presents?with 1 week history?of fatigue, weakness,?non-productive cough,?decreased oral intake,?loss of taste, and?subjective unquantified?weight loss. ?Reports?that she got the Covid vaccine?x1 dose?about?1 to 2 weeks ago at [facility] in [place]. ?Patient was brought in by her son,?who reports that she?has?deteriorated significantly from her baseline of being able to walk 4 miles a day?to barely?being able to?walk up?the stairs due to the fatigue. ?Patient?lives alone but the son checks up on her frequently.? Patient reports that she is able to?perform her ADLs without difficulty?and ambulate?without assistance at baseline.? He left for 24 days?for his job, came back?yesterday,?and found?his mom to be?lethargic, weak, barely able to walk,?and noticed significant weightloss so he brought her in for evaluation. ?Of note patient does have a?history of falls recently?in the past month?the work-up which has been negative that time incidentally found to have pneumonia for which she was given outpatient antibiotics.??Patient does report?that she fell on her belly?1 to 2 weeks ago, but reports no pain at this time.? She denies?headache, blurry vision, chest pain, chills,?diarrhea,?urinary symptoms,?shortness of breath, lightheadedness, dizziness, nausea, vomiting, recent travel or recent  sick contacts.?She does endorse constipation x1 week?for which she has tried?Pepto-Bismol?without resolution.  ?  PMH:?CAD s/p pacemaker, follows [facility] cardiology.? Hypertension, hyperlipidemia, breast cancer status post treatment  PSH:?Cholecystectomy?08/2019, dual-chamber?pacemaker placement 11/2018  FH: Colon cancer/mom,?prostate cancer/dad,?throat cancer/brother and sister  SH:?Patient denies?smoking, alcohol use, IV drug use or prescription drug abuse  Allergies: Nuts, penicillins, seafood  ?  Home Medications (22) Active  Advair HFA 45 mcg-21 mcg/inh inhalation aerosol?2 puff(s), INH, BID  alendronate 70 mg oral tablet?See Instructions  amlodipine 5 mg oral tablet?5 mg = 1 tab(s), Oral, Daily  aspirin 81 mg oral Delayed Release (EC) tablet?81 mg = 1 tab(s), Oral, Daily  carvedilol 3.125 mg oral tablet?3.125 mg = 1 tab(s), Oral, BID  codeine-guaifenesin 10 mg-100 mg/5 mL oral syrup?5 mL, Oral, q6hr  COMBIGAN ? ? SOL 0.2/0.5%?  DORZOL/TIMOL SOL 22.3-6.8?1 drop(s), OPTH, BID  Dulcolax Stool Softener 100 mg oral capsule?100 mg = 1 cap(s), PRN, Oral, BID  Flonase 50 mcg/inh nasal spray?1 spray(s), Nasal, Daily  ketoconazole 2% topical cream?1 marysol, TOP, Daily  LATANOPROST ?SOL 0.005%?  levofloxacin 750 mg oral tablet?750 mg = 1 tab(s), Oral, Daily  Milk of Magnesia 8% oral susp?1.2 gm = 15 mL, PRN, Oral, BID  Nexium 20 mg oral delayed release capsule?See Instructions  nitroglycerin 0.4 mg sublingual TAB?0.4 mg = 1 tab(s), SL, q5min  Pantoprazole 40 mg ORAL EC-Tablet?40 mg = 1 tab(s), Oral, Daily  simvastatin 40 mg oral tablet?40 mg = 1 tab(s), Oral, Once a day (at bedtime)  traMADol 50 mg oral tablet?50 mg = 1 tab(s), PRN, Oral, q4hr  Tylenol 8 HR Arthritis Pain 650 mg oral tablet, extended release?650 mg = 1 tab(s), Oral, BID  Vitamin D3 5000 intl units (125 mcg) oral capsule?5,000 IntUnit = 1 cap(s), Oral, Daily  Voltaren 1% topical gel?1 marysol, TOP, QID  Review of Systems  Constitutional:?No?fever, +fatigue,  +weakness, +lethargy, + weightloss, +decreased appetite + decrease taste  Eye:?No vision loss, blurred vision, or changes in vision  ENMT:?No sore throat, ear pain, sinus pain/congestion, nasal congestion/drainage  Respiratory:?? +cough, shortness of breath, no difficulty breathing, no wheezing  Cardiovascular:?no Cp,?no palpitations, no edema  Gastrointestinal:?No nausea, vomiting?or diarrhea. No abdominal pain. ,+ constipation x 1 week  Genitourinary:?No dysuria, no urinary frequency or urgency, no hematuria  Musculoskeletal:?No muscle or joint pain, no joint swelling  Integumentary:?No skin rash or abnormal lesion  Neurologic:?No headache, no dizziness, no numbness  Physical Exam  Vitals & Measurements  T:?36.4? ?C (Oral)? TMIN:?36.4? ?C (Oral)? TMAX:?36.5? ?C (Temporal Artery)? HR:?60(Peripheral)? RR:?12? BP:?107/50? BP:?120/65(Sitting)? BP:?103/70(Standing)? BP:?107/68(Supine)? SpO2:?95%? WT:?41.95?kg? BMI:?20.8?  General:?NAD, AAO, laying in bed, cachectic,?teeth missing so difficult to understand  Eye:??Pupils are equal, scleral icterus  HENT:??Normocephalic, atraumatic,? no rhinorrhea, PERRLA, + jaundice roof of mouth  Neck:??Supple, no thyromegaly, no LAD,?no JVD  Cardiovascular: RRR, + S1/S2,?no murmurs, rubs or gallops.?No peripheral edema, pacemaker in left chest wall  Pulmonary: CTAB, normal work of breathing, no wheezes, no cyanosis  Gastrointestinal:??Soft, NTTP, BS x 4  Musculoskeletal:??No swelling, No deformity  Integumentary:??Warm, No rash,?no clubbing, jaundiced skin  Psychiatric: Appropriate affect.  Neuro: AAO x 4, difficulty in??recalling timeline of events  Assessment/Plan  New Onset:  Hyperbilirubinemia  Transaminitis  Obstructive jaundice  -Admission?labs remarkable for?AST//271, ?alk phos 749?bilirubin 18.7,?bili direct 12.6, bili indirect 6.1  -Cholecystectomy in 2019  -Unclear at this time?will?is causing this new onset?liver injury, hep panel, HIV, negative, syphilis Ab+/RPR- in  the past  -Possible?that this is post Covid vaccine related?transaminitis given ?recent history?of Covid vaccination x1 to 2 weeks ago, will need to get records from [facility] in [place] to confirm date of first vaccination  -Covid negative,?EKG within normal limits,?CXR unremarkable, prelim read of ultrasound?abdomen, CT abdomen, and triple phase CT?concerning for?intrahepatic ductal dilation, non-dilated bowels.??Awaiting official reads to r/o obstructive jaundice  -Continue to trend?labs?and IV hydration?  ?  Hypokalemia  Hyponatremia  Hypochloremia  Likely secondary to anorexia/dehydration  -Fatigue, weakness, hypertension,?recent falls?likely secondary to?combination of above  -Admission?K+ 2.6?repleted?with riders?and 40 p.o., sodium?132, chloride?89,?UA?with ketones?likely from starvation,?although normal specific gravity,?given 1L bolus NS?in the ED  -Electrolyte abnormalities?likely secondary to?decreased p.o. intake?and dehydration, will continue to replete?and continue IV hydration?with NS  ?   History of CAD s/p pacemaker?in 2018  Hypertension  Hyperlipidemia  -Patient asymptomatic at this time, followed by [facility] cardiology  -Holding?hypertensive given?hypertensive with BP of 107/50, takes coreg 12.5 BID and amplodipine 5 at home  -We will hold?home statin at this time given transaminitis  ?   Constipation  -Can try Colace/MiraLAX?once patient is adequately hydrated?if no BM has?occurred?over the next 24 hours  ?   Mild cognitive impairment  Impaired mobility  -Seems a chronic issue for patient?per?son,?some forgetfulness,?has been seen by geriatric outpatient  -Alert and oriented?during?interview,?usually able to perform her ADLs, however per son?recently she is barely able to walk?without holding onto things  -We will eventually need PT/OT?eval prior to discharge once she is properly hydrated  ?   Disposition: Admitted?to telemetry?for new onset?transaminitis, jaundice/hyperbilirubinemia.? Continue IV  hydration?and replete electrolytes?as needed.? Need to confirm?Covid vaccination date?with [facility] in [place]?to see if this is a side effect of the Covid vaccine.  ?   DVT PPx: Lovenox 40  GI ppx: Protonix 40 daily  Diet: Cardiac  IVF:?NS?at 125 cc/h  Oxygenation: On room air  ?  ?  ?  ?   Problem List/Past Medical History  Ongoing  Abnormal gait  Breast cancer  CAD - Coronary artery disease  Cardiac pacemaker  Defibrillator  Essential hypertension  HLD (hyperlipidemia)  HTN (hypertension)  Hyperlipidemia  Impingement syndrome of right shoulder  Impingement syndrome of right shoulder  Knowledge deficit  Memory loss  Mild cognitive impairment  Obesity  Pacemaker  Historical  MI (myocardial infarction)  Procedure/Surgical History  Cholangiogram (Surgery) (None) (08/14/2019)  Cholecystectomy Laparoscopic (None) (08/14/2019)  Laparoscopy, surgical; cholecystectomy with cholangiography (08/14/2019)  Resection of Gallbladder, Percutaneous Endoscopic Approach (08/14/2019)  Insertion of Pacemaker, Dual Chamber into Chest Subcutaneous Tissue and Fascia, Open Approach (11/07/2018)  Removal of Cardiac Rhythm Related Device from Trunk Subcutaneous Tissue and Fascia, Open Approach (11/07/2018)  Removal of permanent pacemaker pulse generator with replacement of pacemaker pulse generator; dual lead system (11/07/2018)  Pacemaker (06/18/2010)  Breast cancer  Closure of fistula of vocal cord  LEANN BSO - Total abdominal hysterectomy and bilateral salpingo-oophorectomy  vocal cord surgery x 2   Social History  Abuse/Neglect  No, 02/01/2021  No, 01/31/2021  No, No, Yes, 01/27/2021  No, 01/21/2021  No, 12/28/2020  No, No, Yes, 10/15/2020  Alcohol - Denies Alcohol Use, 09/19/2014  Never, 11/01/2018  Never, 06/26/2015  Employment/School  Disabled, 08/29/2019  disabled, Operates hazardous equipment: No., 11/02/2016  Exercise  Exercise frequency: Daily. Self assessment: Poor condition. Exercise type: Walking.,  05/04/2015  Home/Environment  Lives with Alone. Living situation: Home/Independent., 08/29/2019  Nutrition/Health  Regular, portion control, 04/07/2015  Sexual  Sexually active: No. Sexual orientation: Straight or heterosexual. Gender Identity Identifies as female., 06/18/2019  Spiritual/Cultural  Mandaeism, 02/18/2020  Substance Use - Denies Substance Abuse, 09/19/2014  Never, 06/26/2015  Tobacco - Denies Tobacco Use, 09/19/2014  Never (less than 100 in lifetime), N/A, 02/01/2021  Never (less than 100 in lifetime), No, 01/31/2021  Never (less than 100 in lifetime), No, 01/27/2021  Never (less than 100 in lifetime), No, 01/16/2021  Never (less than 100 in lifetime), No, 12/28/2020  Never (less than 100 in lifetime), N/A, 10/15/2020  Family History  Primary malignant neoplasm of colon: Mother.  Primary malignant neoplasm of prostate: Father.  Throat cancer.: Sister and Brother.  Immunizations  Vaccine Date Status   pneumococcal 13-valent conjugate vaccine 07/21/2020 Given   influenza virus vaccine, inactivated 11/01/2018 Given   influenza virus vaccine, inactivated 11/02/2016 Given   influenza virus vaccine, inactivated 11/02/2015 Given   influenza virus vaccine, inactivated 12/08/2014 Recorded   pneumococcal vacc 10/02/2009 Recorded   Lab Results  Labs Last 24 Hours?  ?Chemistry? Hematology/Coagulation?   Sodium Lvl:?132 mmol/L?Low (01/31/21 22:05:00) WBC: 8.4 x10(3)/mcL (01/31/21 22:05:00)   Potassium Lvl:?2.6 mmol/L?Critical (01/31/21 22:05:00) RBC: 4.08 x10(6)/mcL (01/31/21 22:05:00)   Chloride:?89 mmol/L?Low (01/31/21 22:05:00) Hgb: 12.6 gm/dL (01/31/21 22:05:00)   CO2: 26 mmol/L (01/31/21 22:05:00) Hct:?34.9 %?Low (01/31/21 22:05:00)   Calcium Lvl: 8.6 mg/dL (01/31/21 22:05:00) Platelet: 192 x10(3)/mcL (01/31/21 22:05:00)   Magnesium Lvl: 2.11 mg/dL (01/31/21 22:05:00) MCV: 85.5 fL (01/31/21 22:05:00)   Glucose Lvl:?65 mg/dL?Low (01/31/21 22:05:00) MCH: 30.9 pg (01/31/21 22:05:00)   BUN: 14 mg/dL (01/31/21  22:05:00) MCHC: 36.1 gm/dL (01/31/21 22:05:00)   Creatinine: 0.7 mg/dL (01/31/21 22:05:00) RDW:?16.4 %?High (01/31/21 22:05:00)   Est Creat Clearance Ser: 37.12 mL/min (02/01/21 02:56:21) MPV:?11.8 fL?High (01/31/21 22:05:00)   eGFR-AA: 104 (01/31/21 22:05:00) Abs Neut: 7.39 x10(3)/mcL (01/31/21 22:05:00)   eGFR-YEISON:?86 mL/min/1.73 m2?Low (01/31/21 22:05:00) Neutro Auto: 88 % (01/31/21 22:05:00)   Bili Total:?18.7 mg/dL?High (01/31/21 22:05:00) Lymph Auto: 8 % (01/31/21 22:05:00)   Bili Direct:?12.6 mg/dL?High (01/31/21 22:05:00) Mono Auto: 3 % (01/31/21 22:05:00)   Bili Indirect:?6.1 mg/dL?High (01/31/21 22:05:00) Eos Auto: 0 % (01/31/21 22:05:00)   AST:?342 unit/L?High (01/31/21 22:05:00) Abs Eos: 0 x10(3)/mcL (01/31/21 22:05:00)   ALT:?271 unit/L?High (01/31/21 22:05:00) Basophil Auto: 0 % (01/31/21 22:05:00)   Alk Phos:?749 unit/L?High (01/31/21 22:05:00) Abs Neutro: 7.39 x10(3)/mcL (01/31/21 22:05:00)   Total Protein: 6.7 gm/dL (01/31/21 22:05:00) Abs Lymph: 0.7 x10(3)/mcL (01/31/21 22:05:00)   Albumin Lvl:?2 gm/dL?Low (01/31/21 22:05:00) Abs Mono: 0.3 x10(3)/mcL (01/31/21 22:05:00)   Globulin:?4.7 gm/dL?High (01/31/21 22:05:00) Abs Baso: 0 x10(3)/mcL (01/31/21 22:05:00)   A/G Ratio:?0.4 ratio?Low (01/31/21 22:05:00) IG%: 1 % (01/31/21 22:05:00)   Total CK:?215 U/L?High (01/31/21 22:05:00) IG#: 0.05 (01/31/21 22:05:00)   CK MB:?4.6 ng/mL?High (01/31/21 22:05:00)    Troponin-I: 0.033 ng/mL (01/31/21 22:05:00)        Per [facility] read:?Limited ultrasound abdomen?with biliary ductal dilation?of 15 mm.? CT abdomen pelvis with and without contrast:?Poorly marginated 2.3 cm.-Right hepatic nodule, plasm not ruled out, CBD 2 cm.? No bowel obstruction, no enteritis, no colitis.   I was present with the resident during the history and physical examination.  ???  [x ] I discussed the case with the resident and agree with the findings and plan as documented in the residents note.  [ ] I discussed the case with the  resident and agree with the findings and plan as documented in the residents note except:  Elderly patient with history of multiple falls, decreased appetite,not feeling well since COVID vaccine 2 weeks ago  BP noted to be WNL but with patients age, may be symptomatic  Holding all BP medications  Jaundice noted on physical exam  Heart-rrr lungs-clear  abd- soft non tender  ext- no edema  obstructive jaundice by labs  CT with dilated intrahepatic/extrahepatic/pancreatic head mass vs ductal mass  GI consulted to evaluate for ERCP/EUS  Replacing potassium, sodium likely secondary to poor intake